# Patient Record
Sex: FEMALE | Race: WHITE | Employment: OTHER | URBAN - METROPOLITAN AREA
[De-identification: names, ages, dates, MRNs, and addresses within clinical notes are randomized per-mention and may not be internally consistent; named-entity substitution may affect disease eponyms.]

---

## 2017-01-01 ENCOUNTER — OFFICE VISIT (OUTPATIENT)
Dept: ONCOLOGY | Age: 82
End: 2017-01-01

## 2017-01-01 ENCOUNTER — HOSPITAL ENCOUNTER (OUTPATIENT)
Dept: PREADMISSION TESTING | Age: 82
Discharge: HOME | DRG: 326 | End: 2017-08-14
Attending: SURGERY
Payer: MEDICARE

## 2017-01-01 ENCOUNTER — OFFICE VISIT (OUTPATIENT)
Dept: SURGERY | Age: 82
End: 2017-01-01

## 2017-01-01 ENCOUNTER — TELEPHONE (OUTPATIENT)
Dept: SURGERY | Age: 82
End: 2017-01-01

## 2017-01-01 ENCOUNTER — OFFICE VISIT (OUTPATIENT)
Dept: FAMILY MEDICINE CLINIC | Age: 82
End: 2017-01-01

## 2017-01-01 ENCOUNTER — ANESTHESIA EVENT (OUTPATIENT)
Dept: SURGERY | Age: 82
DRG: 326 | End: 2017-01-01
Payer: MEDICARE

## 2017-01-01 ENCOUNTER — ANESTHESIA (OUTPATIENT)
Dept: ENDOSCOPY | Age: 82
End: 2017-01-01
Payer: MEDICARE

## 2017-01-01 ENCOUNTER — HOSPITAL ENCOUNTER (INPATIENT)
Age: 82
LOS: 2 days | DRG: 326 | End: 2017-08-19
Attending: SURGERY | Admitting: SURGERY
Payer: MEDICARE

## 2017-01-01 ENCOUNTER — HOSPITAL ENCOUNTER (OUTPATIENT)
Dept: CT IMAGING | Age: 82
Discharge: HOME | End: 2017-03-28
Attending: INTERNAL MEDICINE
Payer: MEDICARE

## 2017-01-01 ENCOUNTER — ANESTHESIA (OUTPATIENT)
Dept: SURGERY | Age: 82
DRG: 326 | End: 2017-01-01
Payer: MEDICARE

## 2017-01-01 ENCOUNTER — APPOINTMENT (OUTPATIENT)
Dept: GENERAL RADIOLOGY | Age: 82
End: 2017-01-01
Payer: MEDICARE

## 2017-01-01 ENCOUNTER — HOSPITAL ENCOUNTER (OUTPATIENT)
Age: 82
Setting detail: OUTPATIENT SURGERY
Discharge: HOME | End: 2017-07-27
Attending: INTERNAL MEDICINE | Admitting: INTERNAL MEDICINE
Payer: MEDICARE

## 2017-01-01 ENCOUNTER — APPOINTMENT (OUTPATIENT)
Dept: GENERAL RADIOLOGY | Age: 82
DRG: 326 | End: 2017-01-01
Attending: INTERNAL MEDICINE
Payer: MEDICARE

## 2017-01-01 ENCOUNTER — HOSPITAL ENCOUNTER (OUTPATIENT)
Dept: CT IMAGING | Age: 82
Discharge: HOME | End: 2017-08-07
Attending: INTERNAL MEDICINE
Payer: MEDICARE

## 2017-01-01 ENCOUNTER — TELEPHONE (OUTPATIENT)
Dept: ONCOLOGY | Age: 82
End: 2017-01-01

## 2017-01-01 ENCOUNTER — ANESTHESIA EVENT (OUTPATIENT)
Dept: ENDOSCOPY | Age: 82
End: 2017-01-01
Payer: MEDICARE

## 2017-01-01 ENCOUNTER — DOCUMENTATION ONLY (OUTPATIENT)
Dept: ONCOLOGY | Age: 82
End: 2017-01-01

## 2017-01-01 ENCOUNTER — APPOINTMENT (OUTPATIENT)
Dept: CT IMAGING | Age: 82
DRG: 326 | End: 2017-01-01
Attending: INTERNAL MEDICINE
Payer: MEDICARE

## 2017-01-01 ENCOUNTER — HOSPITAL ENCOUNTER (OUTPATIENT)
Dept: CT IMAGING | Age: 82
End: 2017-01-01
Attending: INTERNAL MEDICINE
Payer: MEDICARE

## 2017-01-01 ENCOUNTER — APPOINTMENT (OUTPATIENT)
Dept: GENERAL RADIOLOGY | Age: 82
DRG: 326 | End: 2017-01-01
Attending: SURGERY
Payer: MEDICARE

## 2017-01-01 VITALS
HEART RATE: 88 BPM | WEIGHT: 172.8 LBS | DIASTOLIC BLOOD PRESSURE: 59 MMHG | SYSTOLIC BLOOD PRESSURE: 104 MMHG | BODY MASS INDEX: 33.92 KG/M2 | HEIGHT: 60 IN | OXYGEN SATURATION: 97 % | RESPIRATION RATE: 18 BRPM | TEMPERATURE: 98.6 F

## 2017-01-01 VITALS
DIASTOLIC BLOOD PRESSURE: 86 MMHG | RESPIRATION RATE: 9 BRPM | OXYGEN SATURATION: 99 % | HEART RATE: 71 BPM | TEMPERATURE: 97.8 F | HEIGHT: 60 IN | SYSTOLIC BLOOD PRESSURE: 140 MMHG | WEIGHT: 171.38 LBS | BODY MASS INDEX: 33.65 KG/M2

## 2017-01-01 VITALS
RESPIRATION RATE: 22 BRPM | SYSTOLIC BLOOD PRESSURE: 125 MMHG | HEIGHT: 60 IN | OXYGEN SATURATION: 96 % | HEART RATE: 124 BPM | WEIGHT: 172.18 LBS | DIASTOLIC BLOOD PRESSURE: 49 MMHG | BODY MASS INDEX: 33.8 KG/M2 | TEMPERATURE: 97.7 F

## 2017-01-01 VITALS
HEIGHT: 60 IN | TEMPERATURE: 97.7 F | HEART RATE: 95 BPM | WEIGHT: 178.57 LBS | BODY MASS INDEX: 35.06 KG/M2 | OXYGEN SATURATION: 95 % | RESPIRATION RATE: 18 BRPM | DIASTOLIC BLOOD PRESSURE: 63 MMHG | SYSTOLIC BLOOD PRESSURE: 154 MMHG

## 2017-01-01 VITALS
HEIGHT: 60 IN | HEART RATE: 97 BPM | TEMPERATURE: 97.6 F | OXYGEN SATURATION: 97 % | SYSTOLIC BLOOD PRESSURE: 130 MMHG | RESPIRATION RATE: 18 BRPM | BODY MASS INDEX: 33.57 KG/M2 | WEIGHT: 171 LBS | DIASTOLIC BLOOD PRESSURE: 73 MMHG

## 2017-01-01 VITALS
RESPIRATION RATE: 20 BRPM | BODY MASS INDEX: 33.1 KG/M2 | HEIGHT: 60 IN | HEART RATE: 85 BPM | DIASTOLIC BLOOD PRESSURE: 75 MMHG | TEMPERATURE: 97.6 F | OXYGEN SATURATION: 99 % | SYSTOLIC BLOOD PRESSURE: 121 MMHG | WEIGHT: 168.6 LBS

## 2017-01-01 DIAGNOSIS — J06.9 UPPER RESPIRATORY TRACT INFECTION, UNSPECIFIED TYPE: ICD-10-CM

## 2017-01-01 DIAGNOSIS — R05.8 COUGH PRODUCTIVE OF PURULENT SPUTUM: ICD-10-CM

## 2017-01-01 DIAGNOSIS — I26.99 OTHER PULMONARY EMBOLISM WITHOUT ACUTE COR PULMONALE, UNSPECIFIED CHRONICITY (HCC): ICD-10-CM

## 2017-01-01 DIAGNOSIS — C18.2 MALIGNANT NEOPLASM OF ASCENDING COLON (HCC): Primary | ICD-10-CM

## 2017-01-01 DIAGNOSIS — R05.8 COUGH PRODUCTIVE OF PURULENT SPUTUM: Primary | ICD-10-CM

## 2017-01-01 DIAGNOSIS — C18.9 COLON CANCER (HCC): ICD-10-CM

## 2017-01-01 DIAGNOSIS — I26.99 OTHER PULMONARY EMBOLISM WITHOUT ACUTE COR PULMONALE, UNSPECIFIED CHRONICITY (HCC): Primary | ICD-10-CM

## 2017-01-01 DIAGNOSIS — C18.0 MALIGNANT NEOPLASM OF CECUM (HCC): ICD-10-CM

## 2017-01-01 LAB
ALBUMIN SERPL BCP-MCNC: 3.5 G/DL (ref 3.5–5)
ALBUMIN SERPL-MCNC: 1.8 G/DL (ref 3.5–5)
ALBUMIN SERPL-MCNC: 2 G/DL (ref 3.5–5)
ALBUMIN SERPL-MCNC: 2.3 G/DL (ref 3.5–5)
ALBUMIN SERPL-MCNC: 2.5 G/DL (ref 3.5–5)
ALBUMIN SERPL-MCNC: 2.5 G/DL (ref 3.5–5)
ALBUMIN SERPL-MCNC: 3.3 G/DL (ref 3.5–5)
ALBUMIN/GLOB SERPL: 0.8 {RATIO} (ref 1.1–2.2)
ALBUMIN/GLOB SERPL: 0.9 {RATIO} (ref 1.1–2.2)
ALBUMIN/GLOB SERPL: 1 {RATIO} (ref 1.1–2.2)
ALBUMIN/GLOB SERPL: 1.1 {RATIO} (ref 1.1–2.2)
ALP SERPL-CCNC: 101 U/L (ref 45–117)
ALP SERPL-CCNC: 112 U/L (ref 45–117)
ALP SERPL-CCNC: 36 U/L (ref 45–117)
ALP SERPL-CCNC: 44 U/L (ref 45–117)
ALP SERPL-CCNC: 58 U/L (ref 45–117)
ALP SERPL-CCNC: 67 U/L (ref 45–117)
ALP SERPL-CCNC: 72 U/L (ref 45–117)
ALT SERPL-CCNC: 17 U/L (ref 12–78)
ALT SERPL-CCNC: 17 U/L (ref 12–78)
ALT SERPL-CCNC: 21 U/L (ref 12–78)
ALT SERPL-CCNC: 22 U/L (ref 12–78)
ALT SERPL-CCNC: 35 U/L (ref 12–78)
ALT SERPL-CCNC: 886 U/L (ref 12–78)
ALT SERPL-CCNC: >3500 U/L (ref 12–78)
ANION GAP BLD CALC-SCNC: 19 MMOL/L (ref 5–15)
ANION GAP BLD CALC-SCNC: 6 MMOL/L (ref 5–15)
ANION GAP SERPL CALC-SCNC: 15 MMOL/L (ref 5–15)
ANION GAP SERPL CALC-SCNC: 15 MMOL/L (ref 5–15)
ANION GAP SERPL CALC-SCNC: 17 MMOL/L (ref 5–15)
ANION GAP SERPL CALC-SCNC: 18 MMOL/L (ref 5–15)
ANION GAP SERPL CALC-SCNC: 25 MMOL/L (ref 5–15)
ANION GAP SERPL CALC-SCNC: 34 MMOL/L (ref 5–15)
APPEARANCE UR: CLEAR
APTT PPP: 26.6 SEC (ref 22.1–32.5)
ARTERIAL PATENCY WRIST A: ABNORMAL
ARTERIAL PATENCY WRIST A: YES
ARTERIAL PATENCY WRIST A: YES
AST SERPL W P-5'-P-CCNC: 14 U/L (ref 15–37)
AST SERPL-CCNC: 1313 U/L (ref 15–37)
AST SERPL-CCNC: 24 U/L (ref 15–37)
AST SERPL-CCNC: 25 U/L (ref 15–37)
AST SERPL-CCNC: 45 U/L (ref 15–37)
AST SERPL-CCNC: 74 U/L (ref 15–37)
AST SERPL-CCNC: >2000 U/L (ref 15–37)
ATRIAL RATE: 117 BPM
ATRIAL RATE: 143 BPM
ATRIAL RATE: 95 BPM
BACTERIA URNS QL MICRO: NEGATIVE /HPF
BASE DEFICIT BLDA-SCNC: 14.5 MMOL/L
BASE DEFICIT BLDA-SCNC: 16.8 MMOL/L
BASE DEFICIT BLDA-SCNC: 17.6 MMOL/L
BASE DEFICIT BLDA-SCNC: 17.8 MMOL/L
BASE DEFICIT BLDA-SCNC: 18.1 MMOL/L
BASE DEFICIT BLDA-SCNC: 21.5 MMOL/L
BASE DEFICIT BLDA-SCNC: 9.2 MMOL/L
BASOPHILS # BLD: 0 K/UL
BASOPHILS # BLD: 0 K/UL
BASOPHILS NFR BLD: 0 %
BASOPHILS NFR BLD: 0 %
BDY SITE: ABNORMAL
BILIRUB SERPL-MCNC: 0.3 MG/DL (ref 0.2–1)
BILIRUB SERPL-MCNC: 0.3 MG/DL (ref 0.2–1)
BILIRUB SERPL-MCNC: 0.4 MG/DL (ref 0.2–1)
BILIRUB SERPL-MCNC: 0.4 MG/DL (ref 0.2–1)
BILIRUB SERPL-MCNC: 0.5 MG/DL (ref 0.2–1)
BILIRUB SERPL-MCNC: 0.6 MG/DL (ref 0.2–1)
BILIRUB SERPL-MCNC: 1.1 MG/DL (ref 0.2–1)
BILIRUB UR QL: NEGATIVE
BREATHS.SPONTANEOUS ON VENT: 20
BREATHS.SPONTANEOUS ON VENT: 35
BUN BLD-MCNC: 11 MG/DL (ref 9–20)
BUN SERPL-MCNC: 13 MG/DL (ref 6–20)
BUN SERPL-MCNC: 19 MG/DL (ref 6–20)
BUN SERPL-MCNC: 21 MG/DL (ref 6–20)
BUN SERPL-MCNC: 21 MG/DL (ref 6–20)
BUN SERPL-MCNC: 22 MG/DL (ref 6–20)
BUN SERPL-MCNC: 22 MG/DL (ref 6–20)
BUN SERPL-MCNC: 28 MG/DL (ref 6–20)
BUN/CREAT SERPL: 10 (ref 12–20)
BUN/CREAT SERPL: 18 (ref 12–20)
BUN/CREAT SERPL: 8 (ref 12–20)
BUN/CREAT SERPL: 8 (ref 12–20)
BUN/CREAT SERPL: 9 (ref 12–20)
CA-I BLD-MCNC: 1.18 MMOL/L (ref 1.12–1.32)
CALCIUM SERPL-MCNC: 5.7 MG/DL (ref 8.5–10.1)
CALCIUM SERPL-MCNC: 6.4 MG/DL (ref 8.5–10.1)
CALCIUM SERPL-MCNC: 6.5 MG/DL (ref 8.5–10.1)
CALCIUM SERPL-MCNC: 7.3 MG/DL (ref 8.5–10.1)
CALCIUM SERPL-MCNC: 7.7 MG/DL (ref 8.5–10.1)
CALCIUM SERPL-MCNC: 8 MG/DL (ref 8.5–10.1)
CALCIUM SERPL-MCNC: 8.4 MG/DL (ref 8.5–10.1)
CALCULATED P AXIS, ECG09: 21 DEGREES
CALCULATED P AXIS, ECG09: 42 DEGREES
CALCULATED P AXIS, ECG09: 46 DEGREES
CALCULATED R AXIS, ECG10: -20 DEGREES
CALCULATED R AXIS, ECG10: -44 DEGREES
CALCULATED R AXIS, ECG10: 106 DEGREES
CALCULATED T AXIS, ECG11: 0 DEGREES
CALCULATED T AXIS, ECG11: 26 DEGREES
CALCULATED T AXIS, ECG11: 30 DEGREES
CHLORIDE BLD-SCNC: 99 MMOL/L (ref 98–107)
CHLORIDE SERPL-SCNC: 101 MMOL/L (ref 97–108)
CHLORIDE SERPL-SCNC: 104 MMOL/L (ref 97–108)
CHLORIDE SERPL-SCNC: 104 MMOL/L (ref 97–108)
CHLORIDE SERPL-SCNC: 106 MMOL/L (ref 97–108)
CHLORIDE SERPL-SCNC: 107 MMOL/L (ref 97–108)
CHLORIDE SERPL-SCNC: 94 MMOL/L (ref 97–108)
CHLORIDE SERPL-SCNC: 99 MMOL/L (ref 97–108)
CK MB CFR SERPL CALC: 1.5 % (ref 0–2.5)
CK MB CFR SERPL CALC: 2.2 % (ref 0–2.5)
CK MB CFR SERPL CALC: 2.3 % (ref 0–2.5)
CK MB SERPL-MCNC: 14.5 NG/ML (ref 5–25)
CK MB SERPL-MCNC: 2.4 NG/ML (ref 5–25)
CK MB SERPL-MCNC: 7.8 NG/ML (ref 5–25)
CK SERPL-CCNC: 158 U/L (ref 26–192)
CK SERPL-CCNC: 361 U/L (ref 26–192)
CK SERPL-CCNC: 618 U/L (ref 26–192)
CO2 BLD-SCNC: 22 MMOL/L (ref 21–32)
CO2 SERPL-SCNC: 10 MMOL/L (ref 21–32)
CO2 SERPL-SCNC: 11 MMOL/L (ref 21–32)
CO2 SERPL-SCNC: 13 MMOL/L (ref 21–32)
CO2 SERPL-SCNC: 16 MMOL/L (ref 21–32)
CO2 SERPL-SCNC: 16 MMOL/L (ref 21–32)
CO2 SERPL-SCNC: 18 MMOL/L (ref 21–32)
CO2 SERPL-SCNC: 27 MMOL/L (ref 21–32)
COLOR UR: NORMAL
CORTIS SERPL-MCNC: 64.9 UG/DL
CREAT BLD-MCNC: 0.7 MG/DL (ref 0.6–1.3)
CREAT BLD-MCNC: 0.8 MG/DL (ref 0.6–1.3)
CREAT BLD-MCNC: 1 MG/DL (ref 0.6–1.3)
CREAT SERPL-MCNC: 1.19 MG/DL (ref 0.55–1.02)
CREAT SERPL-MCNC: 1.29 MG/DL (ref 0.55–1.02)
CREAT SERPL-MCNC: 2.24 MG/DL (ref 0.55–1.02)
CREAT SERPL-MCNC: 2.32 MG/DL (ref 0.55–1.02)
CREAT SERPL-MCNC: 2.35 MG/DL (ref 0.55–1.02)
CREAT SERPL-MCNC: 2.63 MG/DL (ref 0.55–1.02)
CREAT SERPL-MCNC: 3.17 MG/DL (ref 0.55–1.02)
DIAGNOSIS, 93000: NORMAL
DIFFERENTIAL METHOD BLD: ABNORMAL
EOSINOPHIL # BLD: 0 K/UL
EOSINOPHIL # BLD: 0 K/UL
EOSINOPHIL NFR BLD: 0 %
EOSINOPHIL NFR BLD: 0 %
EPAP/CPAP/PEEP, PAPEEP: 6
EPAP/CPAP/PEEP, PAPEEP: 8
EPITH CASTS URNS QL MICRO: NORMAL /LPF
ERYTHROCYTE [DISTWIDTH] IN BLOOD BY AUTOMATED COUNT: 20.7 % (ref 11.5–14.5)
ERYTHROCYTE [DISTWIDTH] IN BLOOD BY AUTOMATED COUNT: 20.8 % (ref 11.5–14.5)
ERYTHROCYTE [DISTWIDTH] IN BLOOD BY AUTOMATED COUNT: 21.1 % (ref 11.5–14.5)
ERYTHROCYTE [DISTWIDTH] IN BLOOD BY AUTOMATED COUNT: 21.4 % (ref 11.5–14.5)
ERYTHROCYTE [DISTWIDTH] IN BLOOD BY AUTOMATED COUNT: 21.9 % (ref 11.5–14.5)
FIO2 ON VENT: 50 %
FIO2 ON VENT: 60 %
GAS FLOW.O2 O2 DELIVERY SYS: 6 L/MIN
GAS FLOW.O2 SETTING OXYMISER: 14 L/MIN
GAS FLOW.O2 SETTING OXYMISER: 14 L/MIN
GAS FLOW.O2 SETTING OXYMISER: 24 L/MIN
GLOBULIN SER CALC-MCNC: 2.2 G/DL (ref 2–4)
GLOBULIN SER CALC-MCNC: 2.3 G/DL (ref 2–4)
GLOBULIN SER CALC-MCNC: 2.4 G/DL (ref 2–4)
GLOBULIN SER CALC-MCNC: 2.9 G/DL (ref 2–4)
GLOBULIN SER CALC-MCNC: 3.1 G/DL (ref 2–4)
GLOBULIN SER CALC-MCNC: 3.6 G/DL (ref 2–4)
GLOBULIN SER CALC-MCNC: 3.6 G/DL (ref 2–4)
GLUCOSE BLD STRIP.AUTO-MCNC: 101 MG/DL (ref 65–100)
GLUCOSE BLD STRIP.AUTO-MCNC: 102 MG/DL (ref 65–100)
GLUCOSE BLD STRIP.AUTO-MCNC: 102 MG/DL (ref 65–100)
GLUCOSE BLD STRIP.AUTO-MCNC: 111 MG/DL (ref 65–100)
GLUCOSE BLD STRIP.AUTO-MCNC: 158 MG/DL (ref 65–100)
GLUCOSE BLD STRIP.AUTO-MCNC: 211 MG/DL (ref 65–100)
GLUCOSE BLD STRIP.AUTO-MCNC: 246 MG/DL (ref 65–100)
GLUCOSE BLD STRIP.AUTO-MCNC: 255 MG/DL (ref 65–100)
GLUCOSE BLD STRIP.AUTO-MCNC: 41 MG/DL (ref 65–100)
GLUCOSE BLD STRIP.AUTO-MCNC: 41 MG/DL (ref 65–100)
GLUCOSE BLD STRIP.AUTO-MCNC: 59 MG/DL (ref 65–100)
GLUCOSE BLD STRIP.AUTO-MCNC: 69 MG/DL (ref 65–100)
GLUCOSE BLD-MCNC: 98 MG/DL (ref 65–100)
GLUCOSE SERPL-MCNC: 117 MG/DL (ref 65–100)
GLUCOSE SERPL-MCNC: 139 MG/DL (ref 65–100)
GLUCOSE SERPL-MCNC: 143 MG/DL (ref 65–100)
GLUCOSE SERPL-MCNC: 154 MG/DL (ref 65–100)
GLUCOSE SERPL-MCNC: 82 MG/DL (ref 65–100)
GLUCOSE SERPL-MCNC: 97 MG/DL (ref 65–100)
GLUCOSE SERPL-MCNC: 98 MG/DL (ref 65–100)
GLUCOSE UR STRIP.AUTO-MCNC: NEGATIVE MG/DL
HCO3 BLDA-SCNC: 10 MMOL/L (ref 22–26)
HCO3 BLDA-SCNC: 15 MMOL/L (ref 22–26)
HCO3 BLDA-SCNC: 8 MMOL/L (ref 22–26)
HCO3 BLDA-SCNC: 9 MMOL/L (ref 22–26)
HCT VFR BLD AUTO: 26.3 % (ref 35–47)
HCT VFR BLD AUTO: 29.2 % (ref 35–47)
HCT VFR BLD AUTO: 29.4 % (ref 35–47)
HCT VFR BLD AUTO: 29.6 % (ref 35–47)
HCT VFR BLD AUTO: 31.1 % (ref 35–47)
HCT VFR BLD AUTO: 33.9 % (ref 35–47)
HCT VFR BLD CALC: 31 % (ref 35–47)
HGB BLD-MCNC: 10.5 GM/DL (ref 11.5–16)
HGB BLD-MCNC: 7.6 G/DL (ref 11.5–16)
HGB BLD-MCNC: 8.3 G/DL (ref 11.5–16)
HGB BLD-MCNC: 8.6 G/DL (ref 11.5–16)
HGB BLD-MCNC: 8.9 G/DL (ref 11.5–16)
HGB BLD-MCNC: 9.2 G/DL (ref 11.5–16)
HGB BLD-MCNC: 9.7 G/DL (ref 11.5–16)
HGB UR QL STRIP: NEGATIVE
HYALINE CASTS URNS QL MICRO: NORMAL /LPF (ref 0–5)
INR PPP: 1 (ref 0.9–1.1)
INR PPP: 1.9 (ref 0.9–1.1)
KETONES UR QL STRIP.AUTO: NEGATIVE MG/DL
LACTATE SERPL-SCNC: 11.2 MMOL/L (ref 0.4–2)
LACTATE SERPL-SCNC: 18 MMOL/L (ref 0.4–2)
LACTATE SERPL-SCNC: 25.4 MMOL/L (ref 0.4–2)
LEUKOCYTE ESTERASE UR QL STRIP.AUTO: NEGATIVE
LYMPHOCYTES # BLD: 1.2 K/UL
LYMPHOCYTES # BLD: 2.4 K/UL
LYMPHOCYTES NFR BLD: 21 %
LYMPHOCYTES NFR BLD: 38 %
MAGNESIUM SERPL-MCNC: 2.1 MG/DL (ref 1.6–2.4)
MAGNESIUM SERPL-MCNC: 2.2 MG/DL (ref 1.6–2.4)
MCH RBC QN AUTO: 19.4 PG (ref 26–34)
MCH RBC QN AUTO: 19.6 PG (ref 26–34)
MCH RBC QN AUTO: 19.9 PG (ref 26–34)
MCH RBC QN AUTO: 20.3 PG (ref 26–34)
MCH RBC QN AUTO: 21.5 PG (ref 26–34)
MCHC RBC AUTO-ENTMCNC: 28.4 G/DL (ref 30–36.5)
MCHC RBC AUTO-ENTMCNC: 28.9 G/DL (ref 30–36.5)
MCHC RBC AUTO-ENTMCNC: 29.3 G/DL (ref 30–36.5)
MCHC RBC AUTO-ENTMCNC: 29.6 G/DL (ref 30–36.5)
MCHC RBC AUTO-ENTMCNC: 30.1 G/DL (ref 30–36.5)
MCV RBC AUTO: 66.2 FL (ref 80–99)
MCV RBC AUTO: 68.5 FL (ref 80–99)
MCV RBC AUTO: 68.8 FL (ref 80–99)
MCV RBC AUTO: 68.9 FL (ref 80–99)
MCV RBC AUTO: 71.7 FL (ref 80–99)
METAMYELOCYTES NFR BLD MANUAL: 4 %
METAMYELOCYTES NFR BLD MANUAL: 9 %
MONOCYTES # BLD: 0.1 K/UL
MONOCYTES # BLD: 0.7 K/UL
MONOCYTES NFR BLD: 11 %
MONOCYTES NFR BLD: 2 %
MYELOCYTES NFR BLD MANUAL: 2 %
NEUTS BAND NFR BLD MANUAL: 15 %
NEUTS BAND NFR BLD MANUAL: 19 %
NEUTS SEG # BLD: 2.6 K/UL
NEUTS SEG # BLD: 4 K/UL
NEUTS SEG NFR BLD: 27 %
NEUTS SEG NFR BLD: 52 %
NITRITE UR QL STRIP.AUTO: NEGATIVE
NRBC # BLD: 0.05 K/UL (ref 0–0.01)
NRBC # BLD: 0.07 K/UL (ref 0–0.01)
NRBC BLD-RTO: 0.7 PER 100 WBC
NRBC BLD-RTO: 1.3 PER 100 WBC
P-R INTERVAL, ECG05: 130 MS
P-R INTERVAL, ECG05: 140 MS
P-R INTERVAL, ECG05: 162 MS
PCO2 BLDA: 22 MMHG (ref 35–45)
PCO2 BLDA: 26 MMHG (ref 35–45)
PCO2 BLDA: 26 MMHG (ref 35–45)
PCO2 BLDA: 27 MMHG (ref 35–45)
PCO2 BLDA: 28 MMHG (ref 35–45)
PCO2 BLDA: 31 MMHG (ref 35–45)
PCO2 BLDA: 32 MMHG (ref 35–45)
PH BLDA: 7.06 [PH] (ref 7.35–7.45)
PH BLDA: 7.12 [PH] (ref 7.35–7.45)
PH BLDA: 7.13 [PH] (ref 7.35–7.45)
PH BLDA: 7.17 [PH] (ref 7.35–7.45)
PH BLDA: 7.19 [PH] (ref 7.35–7.45)
PH BLDA: 7.28 [PH] (ref 7.35–7.45)
PH BLDA: 7.35 [PH] (ref 7.35–7.45)
PH UR STRIP: 6 [PH] (ref 5–8)
PHOSPHATE SERPL-MCNC: 10.2 MG/DL (ref 2.6–4.7)
PHOSPHATE SERPL-MCNC: 7.9 MG/DL (ref 2.6–4.7)
PLATELET # BLD AUTO: 128 K/UL (ref 150–400)
PLATELET # BLD AUTO: 173 K/UL (ref 150–400)
PLATELET # BLD AUTO: 205 K/UL (ref 150–400)
PLATELET # BLD AUTO: 221 K/UL (ref 150–400)
PLATELET # BLD AUTO: 314 K/UL (ref 150–400)
PO2 BLDA: 100 MMHG (ref 80–100)
PO2 BLDA: 103 MMHG (ref 80–100)
PO2 BLDA: 71 MMHG (ref 80–100)
PO2 BLDA: 75 MMHG (ref 80–100)
PO2 BLDA: 87 MMHG (ref 80–100)
PO2 BLDA: 88 MMHG (ref 80–100)
PO2 BLDA: 92 MMHG (ref 80–100)
POTASSIUM BLD-SCNC: 3.9 MMOL/L (ref 3.5–5.1)
POTASSIUM SERPL-SCNC: 3.5 MMOL/L (ref 3.5–5.1)
POTASSIUM SERPL-SCNC: 3.7 MMOL/L (ref 3.5–5.1)
POTASSIUM SERPL-SCNC: 3.8 MMOL/L (ref 3.5–5.1)
POTASSIUM SERPL-SCNC: 4 MMOL/L (ref 3.5–5.1)
POTASSIUM SERPL-SCNC: 4.1 MMOL/L (ref 3.5–5.1)
POTASSIUM SERPL-SCNC: 4.5 MMOL/L (ref 3.5–5.1)
POTASSIUM SERPL-SCNC: 4.7 MMOL/L (ref 3.5–5.1)
PROT SERPL-MCNC: 4.1 G/DL (ref 6.4–8.2)
PROT SERPL-MCNC: 4.4 G/DL (ref 6.4–8.2)
PROT SERPL-MCNC: 4.7 G/DL (ref 6.4–8.2)
PROT SERPL-MCNC: 5.2 G/DL (ref 6.4–8.2)
PROT SERPL-MCNC: 5.6 G/DL (ref 6.4–8.2)
PROT SERPL-MCNC: 6.9 G/DL (ref 6.4–8.2)
PROT SERPL-MCNC: 7.1 G/DL (ref 6.4–8.2)
PROT UR STRIP-MCNC: NEGATIVE MG/DL
PROTHROMBIN TIME: 10 SEC (ref 9–11.1)
PROTHROMBIN TIME: 19.9 SEC (ref 9–11.1)
Q-T INTERVAL, ECG07: 286 MS
Q-T INTERVAL, ECG07: 314 MS
Q-T INTERVAL, ECG07: 354 MS
QRS DURATION, ECG06: 70 MS
QRS DURATION, ECG06: 78 MS
QRS DURATION, ECG06: 82 MS
QTC CALCULATION (BEZET), ECG08: 438 MS
QTC CALCULATION (BEZET), ECG08: 441 MS
QTC CALCULATION (BEZET), ECG08: 444 MS
RBC # BLD AUTO: 3.82 M/UL (ref 3.8–5.2)
RBC # BLD AUTO: 4.13 M/UL (ref 3.8–5.2)
RBC # BLD AUTO: 4.24 M/UL (ref 3.8–5.2)
RBC # BLD AUTO: 4.44 M/UL (ref 3.8–5.2)
RBC # BLD AUTO: 4.54 M/UL (ref 3.8–5.2)
RBC #/AREA URNS HPF: NORMAL /HPF (ref 0–5)
RBC MORPH BLD: ABNORMAL
SAO2 % BLD: 93 % (ref 92–97)
SAO2 % BLD: 93 % (ref 92–97)
SAO2 % BLD: 94 % (ref 92–97)
SAO2 % BLD: 94 % (ref 92–97)
SAO2 % BLD: 95 % (ref 92–97)
SAO2 % BLD: 96 % (ref 92–97)
SAO2 % BLD: 97 % (ref 92–97)
SAO2% DEVICE SAO2% SENSOR NAME: ABNORMAL
SERVICE CMNT-IMP: ABNORMAL
SERVICE CMNT-IMP: NORMAL
SODIUM BLD-SCNC: 135 MMOL/L (ref 136–145)
SODIUM SERPL-SCNC: 132 MMOL/L (ref 136–145)
SODIUM SERPL-SCNC: 135 MMOL/L (ref 136–145)
SODIUM SERPL-SCNC: 136 MMOL/L (ref 136–145)
SODIUM SERPL-SCNC: 137 MMOL/L (ref 136–145)
SODIUM SERPL-SCNC: 138 MMOL/L (ref 136–145)
SODIUM SERPL-SCNC: 139 MMOL/L (ref 136–145)
SODIUM SERPL-SCNC: 139 MMOL/L (ref 136–145)
SP GR UR REFRACTOMETRY: 1.01 (ref 1–1.03)
SPECIMEN SITE: ABNORMAL
THERAPEUTIC RANGE,PTTT: NORMAL SECS (ref 58–77)
TROPONIN I SERPL-MCNC: 0.04 NG/ML
TROPONIN I SERPL-MCNC: 0.06 NG/ML
TROPONIN I SERPL-MCNC: <0.04 NG/ML
TSH SERPL DL<=0.05 MIU/L-ACNC: 8.68 UIU/ML (ref 0.36–3.74)
UA: UC IF INDICATED,UAUC: NORMAL
UROBILINOGEN UR QL STRIP.AUTO: 0.2 EU/DL (ref 0.2–1)
VENTILATION MODE VENT: ABNORMAL
VENTRICULAR RATE, ECG03: 117 BPM
VENTRICULAR RATE, ECG03: 143 BPM
VENTRICULAR RATE, ECG03: 95 BPM
VT SETTING VENT: 400 ML
WBC # BLD AUTO: 4.4 K/UL (ref 3.6–11)
WBC # BLD AUTO: 5.7 K/UL (ref 3.6–11)
WBC # BLD AUTO: 6.2 K/UL (ref 3.6–11)
WBC # BLD AUTO: 7.2 K/UL (ref 3.6–11)
WBC # BLD AUTO: 9.2 K/UL (ref 3.6–11)
WBC MORPH BLD: ABNORMAL
WBC NRBC COR # BLD: ABNORMAL 10*3/UL
WBC NRBC COR # BLD: ABNORMAL 10*3/UL
WBC URNS QL MICRO: NORMAL /HPF (ref 0–4)

## 2017-01-01 PROCEDURE — 77030034849: Performed by: SURGERY

## 2017-01-01 PROCEDURE — 82565 ASSAY OF CREATININE: CPT

## 2017-01-01 PROCEDURE — 82803 BLOOD GASES ANY COMBINATION: CPT | Performed by: INTERNAL MEDICINE

## 2017-01-01 PROCEDURE — 74011000250 HC RX REV CODE- 250

## 2017-01-01 PROCEDURE — 94002 VENT MGMT INPAT INIT DAY: CPT

## 2017-01-01 PROCEDURE — 74011250637 HC RX REV CODE- 250/637: Performed by: INTERNAL MEDICINE

## 2017-01-01 PROCEDURE — 74011250636 HC RX REV CODE- 250/636: Performed by: INTERNAL MEDICINE

## 2017-01-01 PROCEDURE — 77030008684 HC TU ET CUF COVD -B: Performed by: ANESTHESIOLOGY

## 2017-01-01 PROCEDURE — 77030011640 HC PAD GRND REM COVD -A: Performed by: SURGERY

## 2017-01-01 PROCEDURE — 76060000034 HC ANESTHESIA 1.5 TO 2 HR: Performed by: SURGERY

## 2017-01-01 PROCEDURE — 5A1945Z RESPIRATORY VENTILATION, 24-96 CONSECUTIVE HOURS: ICD-10-PCS | Performed by: ANESTHESIOLOGY

## 2017-01-01 PROCEDURE — 76210000000 HC OR PH I REC 2 TO 2.5 HR: Performed by: SURGERY

## 2017-01-01 PROCEDURE — 74011250636 HC RX REV CODE- 250/636

## 2017-01-01 PROCEDURE — 65620000000 HC RM CCU GENERAL

## 2017-01-01 PROCEDURE — 77030021668 HC NEB PREFIL KT VYRM -A

## 2017-01-01 PROCEDURE — 77030033067 HC SUT PDO STRATFX SPIR J&J -B: Performed by: SURGERY

## 2017-01-01 PROCEDURE — 74011000250 HC RX REV CODE- 250: Performed by: SURGERY

## 2017-01-01 PROCEDURE — 77030008771 HC TU NG SALEM SUMP -A: Performed by: SURGERY

## 2017-01-01 PROCEDURE — 74011250637 HC RX REV CODE- 250/637: Performed by: SURGERY

## 2017-01-01 PROCEDURE — 74177 CT ABD & PELVIS W/CONTRAST: CPT

## 2017-01-01 PROCEDURE — 77030018836 HC SOL IRR NACL ICUM -A: Performed by: SURGERY

## 2017-01-01 PROCEDURE — 80053 COMPREHEN METABOLIC PANEL: CPT | Performed by: INTERNAL MEDICINE

## 2017-01-01 PROCEDURE — 82962 GLUCOSE BLOOD TEST: CPT

## 2017-01-01 PROCEDURE — 88309 TISSUE EXAM BY PATHOLOGIST: CPT | Performed by: SURGERY

## 2017-01-01 PROCEDURE — 77030008517 HC TBNG INSUF ENDO STOR -B: Performed by: SURGERY

## 2017-01-01 PROCEDURE — 36415 COLL VENOUS BLD VENIPUNCTURE: CPT | Performed by: SURGERY

## 2017-01-01 PROCEDURE — P9045 ALBUMIN (HUMAN), 5%, 250 ML: HCPCS

## 2017-01-01 PROCEDURE — 77030018316 HC SEAL FBRN TISSL 4ML BAXT -C: Performed by: SURGERY

## 2017-01-01 PROCEDURE — 30233N1 TRANSFUSION OF NONAUTOLOGOUS RED BLOOD CELLS INTO PERIPHERAL VEIN, PERCUTANEOUS APPROACH: ICD-10-PCS | Performed by: SURGERY

## 2017-01-01 PROCEDURE — 74176 CT ABD & PELVIS W/O CONTRAST: CPT

## 2017-01-01 PROCEDURE — 94003 VENT MGMT INPAT SUBQ DAY: CPT

## 2017-01-01 PROCEDURE — 76060000032 HC ANESTHESIA 0.5 TO 1 HR: Performed by: INTERNAL MEDICINE

## 2017-01-01 PROCEDURE — 77030008771 HC TU NG SALEM SUMP -A: Performed by: ANESTHESIOLOGY

## 2017-01-01 PROCEDURE — 36430 TRANSFUSION BLD/BLD COMPNT: CPT

## 2017-01-01 PROCEDURE — 8E0W4CZ ROBOTIC ASSISTED PROCEDURE OF TRUNK REGION, PERCUTANEOUS ENDOSCOPIC APPROACH: ICD-10-PCS | Performed by: SURGERY

## 2017-01-01 PROCEDURE — 77030019908 HC STETH ESOPH SIMS -A: Performed by: ANESTHESIOLOGY

## 2017-01-01 PROCEDURE — 71010 XR CHEST PORT: CPT

## 2017-01-01 PROCEDURE — 77030020061 HC IV BLD WRMR ADMIN SET 3M -B: Performed by: ANESTHESIOLOGY

## 2017-01-01 PROCEDURE — 83605 ASSAY OF LACTIC ACID: CPT | Performed by: INTERNAL MEDICINE

## 2017-01-01 PROCEDURE — 77030016154: Performed by: SURGERY

## 2017-01-01 PROCEDURE — 82550 ASSAY OF CK (CPK): CPT | Performed by: SURGERY

## 2017-01-01 PROCEDURE — 77030013079 HC BLNKT BAIR HGGR 3M -A: Performed by: ANESTHESIOLOGY

## 2017-01-01 PROCEDURE — 74011000250 HC RX REV CODE- 250: Performed by: INTERNAL MEDICINE

## 2017-01-01 PROCEDURE — 77030009426 HC FCPS BIOP ENDOSC BSC -B: Performed by: INTERNAL MEDICINE

## 2017-01-01 PROCEDURE — 77030010507 HC ADH SKN DERMBND J&J -B: Performed by: SURGERY

## 2017-01-01 PROCEDURE — 85025 COMPLETE CBC W/AUTO DIFF WBC: CPT | Performed by: INTERNAL MEDICINE

## 2017-01-01 PROCEDURE — 84100 ASSAY OF PHOSPHORUS: CPT | Performed by: SURGERY

## 2017-01-01 PROCEDURE — 77030018390 HC SPNG HEMSTAT2 J&J -B: Performed by: SURGERY

## 2017-01-01 PROCEDURE — 80047 BASIC METABLC PNL IONIZED CA: CPT

## 2017-01-01 PROCEDURE — 31500 INSERT EMERGENCY AIRWAY: CPT

## 2017-01-01 PROCEDURE — 77030002933 HC SUT MCRYL J&J -A: Performed by: SURGERY

## 2017-01-01 PROCEDURE — 77030019702 HC WRP THER MENM -C: Performed by: SURGERY

## 2017-01-01 PROCEDURE — 82533 TOTAL CORTISOL: CPT | Performed by: INTERNAL MEDICINE

## 2017-01-01 PROCEDURE — 74011000258 HC RX REV CODE- 258: Performed by: INTERNAL MEDICINE

## 2017-01-01 PROCEDURE — C1751 CATH, INF, PER/CENT/MIDLINE: HCPCS

## 2017-01-01 PROCEDURE — 74011000254 HC RX REV CODE- 254

## 2017-01-01 PROCEDURE — 74011000258 HC RX REV CODE- 258

## 2017-01-01 PROCEDURE — 77030035236 HC SUT PDS STRATFX BARB J&J -B: Performed by: SURGERY

## 2017-01-01 PROCEDURE — 77030029640 HC SEAL VSL ENDOWR ONE INTU -F: Performed by: SURGERY

## 2017-01-01 PROCEDURE — 77030020782 HC GWN BAIR PAWS FLX 3M -B

## 2017-01-01 PROCEDURE — 0BH17EZ INSERTION OF ENDOTRACHEAL AIRWAY INTO TRACHEA, VIA NATURAL OR ARTIFICIAL OPENING: ICD-10-PCS | Performed by: ANESTHESIOLOGY

## 2017-01-01 PROCEDURE — 77030016151 HC PROTCTR LNS DFOG COVD -B: Performed by: SURGERY

## 2017-01-01 PROCEDURE — P9016 RBC LEUKOCYTES REDUCED: HCPCS | Performed by: SURGERY

## 2017-01-01 PROCEDURE — 77030008467 HC STPLR SKN COVD -B: Performed by: SURGERY

## 2017-01-01 PROCEDURE — 36556 INSERT NON-TUNNEL CV CATH: CPT

## 2017-01-01 PROCEDURE — 85018 HEMOGLOBIN: CPT | Performed by: SURGERY

## 2017-01-01 PROCEDURE — 76040000007: Performed by: INTERNAL MEDICINE

## 2017-01-01 PROCEDURE — 71275 CT ANGIOGRAPHY CHEST: CPT

## 2017-01-01 PROCEDURE — 74000 XR ABD PORT  1 V: CPT

## 2017-01-01 PROCEDURE — 77030013567 HC DRN WND RESERV BARD -A: Performed by: SURGERY

## 2017-01-01 PROCEDURE — 74011636320 HC RX REV CODE- 636/320: Performed by: INTERNAL MEDICINE

## 2017-01-01 PROCEDURE — 77030003029 HC SUT VCRL J&J -B: Performed by: SURGERY

## 2017-01-01 PROCEDURE — 93005 ELECTROCARDIOGRAM TRACING: CPT

## 2017-01-01 PROCEDURE — 84484 ASSAY OF TROPONIN QUANT: CPT | Performed by: INTERNAL MEDICINE

## 2017-01-01 PROCEDURE — 77030026438 HC STYL ET INTUB CARD -A: Performed by: ANESTHESIOLOGY

## 2017-01-01 PROCEDURE — 77030012406 HC DRN WND PENRS BARD -A: Performed by: SURGERY

## 2017-01-01 PROCEDURE — 03HY32Z INSERTION OF MONITORING DEVICE INTO UPPER ARTERY, PERCUTANEOUS APPROACH: ICD-10-PCS | Performed by: ANESTHESIOLOGY

## 2017-01-01 PROCEDURE — 77030008756 HC TU IRR SUC STRY -B: Performed by: SURGERY

## 2017-01-01 PROCEDURE — 76010000880 HC OR TIME 4 TO 4.5HR INTENSV - TIER 2: Performed by: SURGERY

## 2017-01-01 PROCEDURE — 85610 PROTHROMBIN TIME: CPT | Performed by: INTERNAL MEDICINE

## 2017-01-01 PROCEDURE — 77030002996 HC SUT SLK J&J -A: Performed by: SURGERY

## 2017-01-01 PROCEDURE — 36600 WITHDRAWAL OF ARTERIAL BLOOD: CPT | Performed by: INTERNAL MEDICINE

## 2017-01-01 PROCEDURE — 82550 ASSAY OF CK (CPK): CPT | Performed by: INTERNAL MEDICINE

## 2017-01-01 PROCEDURE — 74011250636 HC RX REV CODE- 250/636: Performed by: SURGERY

## 2017-01-01 PROCEDURE — 77030013797 HC KT TRNSDUC PRSSR EDWD -A

## 2017-01-01 PROCEDURE — 77030034850: Performed by: SURGERY

## 2017-01-01 PROCEDURE — 0DQ90ZZ REPAIR DUODENUM, OPEN APPROACH: ICD-10-PCS | Performed by: SURGERY

## 2017-01-01 PROCEDURE — 80053 COMPREHEN METABOLIC PANEL: CPT | Performed by: SURGERY

## 2017-01-01 PROCEDURE — 83735 ASSAY OF MAGNESIUM: CPT | Performed by: INTERNAL MEDICINE

## 2017-01-01 PROCEDURE — 74011000255 HC RX REV CODE- 255: Performed by: INTERNAL MEDICINE

## 2017-01-01 PROCEDURE — 84484 ASSAY OF TROPONIN QUANT: CPT | Performed by: SURGERY

## 2017-01-01 PROCEDURE — 77030013532 HC SEAL FBRN TISSL RDYTUSE 4ML BAXT -C: Performed by: SURGERY

## 2017-01-01 PROCEDURE — 76060000039 HC ANESTHESIA 4 TO 4.5 HR: Performed by: SURGERY

## 2017-01-01 PROCEDURE — 77030031139 HC SUT VCRL2 J&J -A: Performed by: SURGERY

## 2017-01-01 PROCEDURE — 83735 ASSAY OF MAGNESIUM: CPT | Performed by: SURGERY

## 2017-01-01 PROCEDURE — 77030032522 HC SHT STPL PK ENDOWR INTU -B: Performed by: SURGERY

## 2017-01-01 PROCEDURE — 74011250636 HC RX REV CODE- 250/636: Performed by: ANESTHESIOLOGY

## 2017-01-01 PROCEDURE — 77030002966 HC SUT PDS J&J -A: Performed by: SURGERY

## 2017-01-01 PROCEDURE — 36415 COLL VENOUS BLD VENIPUNCTURE: CPT | Performed by: INTERNAL MEDICINE

## 2017-01-01 PROCEDURE — 77030012879 HC MSK CPAP FLL FAC PHIL -B

## 2017-01-01 PROCEDURE — 85027 COMPLETE CBC AUTOMATED: CPT | Performed by: INTERNAL MEDICINE

## 2017-01-01 PROCEDURE — 02HV33Z INSERTION OF INFUSION DEVICE INTO SUPERIOR VENA CAVA, PERCUTANEOUS APPROACH: ICD-10-PCS | Performed by: ANESTHESIOLOGY

## 2017-01-01 PROCEDURE — 84443 ASSAY THYROID STIM HORMONE: CPT | Performed by: INTERNAL MEDICINE

## 2017-01-01 PROCEDURE — 77010033678 HC OXYGEN DAILY

## 2017-01-01 PROCEDURE — 77030011992 HC AIRWY NASOPHGL TELE -A: Performed by: ANESTHESIOLOGY

## 2017-01-01 PROCEDURE — 77030027138 HC INCENT SPIROMETER -A

## 2017-01-01 PROCEDURE — 0DTF4ZZ RESECTION OF RIGHT LARGE INTESTINE, PERCUTANEOUS ENDOSCOPIC APPROACH: ICD-10-PCS | Performed by: SURGERY

## 2017-01-01 PROCEDURE — 77030034133 HC APPL ENDOSCP FLX SPRY BAXT -C: Performed by: SURGERY

## 2017-01-01 PROCEDURE — 77030032521: Performed by: SURGERY

## 2017-01-01 PROCEDURE — 93306 TTE W/DOPPLER COMPLETE: CPT

## 2017-01-01 PROCEDURE — 88305 TISSUE EXAM BY PATHOLOGIST: CPT | Performed by: INTERNAL MEDICINE

## 2017-01-01 PROCEDURE — 76010000153 HC OR TIME 1.5 TO 2 HR: Performed by: SURGERY

## 2017-01-01 PROCEDURE — 77030020263 HC SOL INJ SOD CL0.9% LFCR 1000ML: Performed by: SURGERY

## 2017-01-01 PROCEDURE — 77030032490 HC SLV COMPR SCD KNE COVD -B: Performed by: SURGERY

## 2017-01-01 PROCEDURE — 83605 ASSAY OF LACTIC ACID: CPT | Performed by: SURGERY

## 2017-01-01 PROCEDURE — 84100 ASSAY OF PHOSPHORUS: CPT | Performed by: INTERNAL MEDICINE

## 2017-01-01 PROCEDURE — 88342 IMHCHEM/IMCYTCHM 1ST ANTB: CPT | Performed by: SURGERY

## 2017-01-01 PROCEDURE — 77030032523 HC RELD STPL PK ENDORS INTU -C: Performed by: SURGERY

## 2017-01-01 DEVICE — SEALANT KT FIBRIN HEMSTAT 4ML -- TISSEEL: Type: IMPLANTABLE DEVICE | Status: FUNCTIONAL

## 2017-01-01 RX ORDER — SODIUM CHLORIDE, SODIUM LACTATE, POTASSIUM CHLORIDE, CALCIUM CHLORIDE 600; 310; 30; 20 MG/100ML; MG/100ML; MG/100ML; MG/100ML
25 INJECTION, SOLUTION INTRAVENOUS CONTINUOUS
Status: DISCONTINUED | OUTPATIENT
Start: 2017-01-01 | End: 2017-01-01 | Stop reason: HOSPADM

## 2017-01-01 RX ORDER — LORATADINE 10 MG/1
10 TABLET ORAL DAILY
Status: DISCONTINUED | OUTPATIENT
Start: 2017-01-01 | End: 2017-01-01

## 2017-01-01 RX ORDER — LIDOCAINE HYDROCHLORIDE 20 MG/ML
INJECTION, SOLUTION EPIDURAL; INFILTRATION; INTRACAUDAL; PERINEURAL AS NEEDED
Status: DISCONTINUED | OUTPATIENT
Start: 2017-01-01 | End: 2017-01-01 | Stop reason: HOSPADM

## 2017-01-01 RX ORDER — SODIUM CHLORIDE 0.9 % (FLUSH) 0.9 %
5-10 SYRINGE (ML) INJECTION AS NEEDED
Status: DISCONTINUED | OUTPATIENT
Start: 2017-01-01 | End: 2017-01-01

## 2017-01-01 RX ORDER — DIPHENHYDRAMINE HCL 25 MG
25 CAPSULE ORAL
Status: DISCONTINUED | OUTPATIENT
Start: 2017-01-01 | End: 2017-01-01

## 2017-01-01 RX ORDER — SODIUM CHLORIDE 9 MG/ML
100 INJECTION, SOLUTION INTRAVENOUS CONTINUOUS
Status: DISCONTINUED | OUTPATIENT
Start: 2017-01-01 | End: 2017-01-01

## 2017-01-01 RX ORDER — DEXTROSE MONOHYDRATE 100 MG/ML
50 INJECTION, SOLUTION INTRAVENOUS CONTINUOUS
Status: DISCONTINUED | OUTPATIENT
Start: 2017-01-01 | End: 2017-01-01 | Stop reason: HOSPADM

## 2017-01-01 RX ORDER — DEXTROSE 50 % IN WATER (D50W) INTRAVENOUS SYRINGE
Status: COMPLETED
Start: 2017-01-01 | End: 2017-01-01

## 2017-01-01 RX ORDER — INSULIN LISPRO 100 [IU]/ML
INJECTION, SOLUTION INTRAVENOUS; SUBCUTANEOUS EVERY 6 HOURS
Status: DISCONTINUED | OUTPATIENT
Start: 2017-01-01 | End: 2017-01-01 | Stop reason: HOSPADM

## 2017-01-01 RX ORDER — SODIUM CHLORIDE, SODIUM LACTATE, POTASSIUM CHLORIDE, CALCIUM CHLORIDE 600; 310; 30; 20 MG/100ML; MG/100ML; MG/100ML; MG/100ML
INJECTION, SOLUTION INTRAVENOUS
Status: DISCONTINUED | OUTPATIENT
Start: 2017-01-01 | End: 2017-01-01 | Stop reason: HOSPADM

## 2017-01-01 RX ORDER — HYDROMORPHONE HYDROCHLORIDE 1 MG/ML
1-2 INJECTION, SOLUTION INTRAMUSCULAR; INTRAVENOUS; SUBCUTANEOUS
Status: DISCONTINUED | OUTPATIENT
Start: 2017-01-01 | End: 2017-01-01 | Stop reason: HOSPADM

## 2017-01-01 RX ORDER — MIDAZOLAM HYDROCHLORIDE 1 MG/ML
INJECTION, SOLUTION INTRAMUSCULAR; INTRAVENOUS
Status: DISPENSED
Start: 2017-01-01 | End: 2017-01-01

## 2017-01-01 RX ORDER — PROPOFOL 10 MG/ML
INJECTION, EMULSION INTRAVENOUS
Status: COMPLETED
Start: 2017-01-01 | End: 2017-01-01

## 2017-01-01 RX ORDER — SODIUM CHLORIDE AND POTASSIUM CHLORIDE .9; .15 G/100ML; G/100ML
SOLUTION INTRAVENOUS CONTINUOUS
Status: DISCONTINUED | OUTPATIENT
Start: 2017-01-01 | End: 2017-01-01 | Stop reason: HOSPADM

## 2017-01-01 RX ORDER — MIDAZOLAM HYDROCHLORIDE 1 MG/ML
INJECTION, SOLUTION INTRAMUSCULAR; INTRAVENOUS
Status: COMPLETED
Start: 2017-01-01 | End: 2017-01-01

## 2017-01-01 RX ORDER — SODIUM CHLORIDE 0.9 % (FLUSH) 0.9 %
5-10 SYRINGE (ML) INJECTION EVERY 8 HOURS
Status: DISCONTINUED | OUTPATIENT
Start: 2017-01-01 | End: 2017-01-01

## 2017-01-01 RX ORDER — ALVIMOPAN 12 MG/1
12 CAPSULE ORAL 2 TIMES DAILY
Status: DISCONTINUED | OUTPATIENT
Start: 2017-01-01 | End: 2017-01-01

## 2017-01-01 RX ORDER — HYDROMORPHONE HYDROCHLORIDE 1 MG/ML
.2-.5 INJECTION, SOLUTION INTRAMUSCULAR; INTRAVENOUS; SUBCUTANEOUS
Status: DISCONTINUED | OUTPATIENT
Start: 2017-01-01 | End: 2017-01-01

## 2017-01-01 RX ORDER — HYDROMORPHONE HYDROCHLORIDE 1 MG/ML
.5-1 INJECTION, SOLUTION INTRAMUSCULAR; INTRAVENOUS; SUBCUTANEOUS
Status: DISCONTINUED | OUTPATIENT
Start: 2017-01-01 | End: 2017-01-01

## 2017-01-01 RX ORDER — SODIUM CHLORIDE, SODIUM LACTATE, POTASSIUM CHLORIDE, CALCIUM CHLORIDE 600; 310; 30; 20 MG/100ML; MG/100ML; MG/100ML; MG/100ML
25 INJECTION, SOLUTION INTRAVENOUS CONTINUOUS
Status: CANCELLED | OUTPATIENT
Start: 2017-01-01

## 2017-01-01 RX ORDER — LEVALBUTEROL INHALATION SOLUTION 0.63 MG/3ML
0.63 SOLUTION RESPIRATORY (INHALATION)
Status: DISCONTINUED | OUTPATIENT
Start: 2017-01-01 | End: 2017-01-01

## 2017-01-01 RX ORDER — SODIUM BICARBONATE 1 MEQ/ML
150 SYRINGE (ML) INTRAVENOUS ONCE
Status: COMPLETED | OUTPATIENT
Start: 2017-01-01 | End: 2017-01-01

## 2017-01-01 RX ORDER — ONDANSETRON 2 MG/ML
INJECTION INTRAMUSCULAR; INTRAVENOUS AS NEEDED
Status: DISCONTINUED | OUTPATIENT
Start: 2017-01-01 | End: 2017-01-01 | Stop reason: HOSPADM

## 2017-01-01 RX ORDER — LIDOCAINE HYDROCHLORIDE 10 MG/ML
0.1 INJECTION, SOLUTION EPIDURAL; INFILTRATION; INTRACAUDAL; PERINEURAL AS NEEDED
Status: DISCONTINUED | OUTPATIENT
Start: 2017-01-01 | End: 2017-01-01

## 2017-01-01 RX ORDER — PROPOFOL 10 MG/ML
INJECTION, EMULSION INTRAVENOUS AS NEEDED
Status: DISCONTINUED | OUTPATIENT
Start: 2017-01-01 | End: 2017-01-01 | Stop reason: HOSPADM

## 2017-01-01 RX ORDER — NALOXONE HYDROCHLORIDE 0.4 MG/ML
0.4 INJECTION, SOLUTION INTRAMUSCULAR; INTRAVENOUS; SUBCUTANEOUS
Status: DISCONTINUED | OUTPATIENT
Start: 2017-01-01 | End: 2017-01-01 | Stop reason: HOSPADM

## 2017-01-01 RX ORDER — MUPIROCIN 20 MG/G
OINTMENT TOPICAL 2 TIMES DAILY
Status: DISCONTINUED | OUTPATIENT
Start: 2017-01-01 | End: 2017-01-01 | Stop reason: HOSPADM

## 2017-01-01 RX ORDER — VALSARTAN 40 MG/1
40 TABLET ORAL DAILY
Status: DISCONTINUED | OUTPATIENT
Start: 2017-01-01 | End: 2017-01-01

## 2017-01-01 RX ORDER — ALVIMOPAN 12 MG/1
12 CAPSULE ORAL ONCE
Status: COMPLETED | OUTPATIENT
Start: 2017-01-01 | End: 2017-01-01

## 2017-01-01 RX ORDER — NEOSTIGMINE METHYLSULFATE 1 MG/ML
INJECTION INTRAVENOUS AS NEEDED
Status: DISCONTINUED | OUTPATIENT
Start: 2017-01-01 | End: 2017-01-01 | Stop reason: HOSPADM

## 2017-01-01 RX ORDER — EPINEPHRINE 0.1 MG/ML
1 INJECTION INTRACARDIAC; INTRAVENOUS
Status: DISCONTINUED | OUTPATIENT
Start: 2017-01-01 | End: 2017-01-01 | Stop reason: HOSPADM

## 2017-01-01 RX ORDER — DEXAMETHASONE SODIUM PHOSPHATE 100 MG/10ML
INJECTION INTRAMUSCULAR; INTRAVENOUS AS NEEDED
Status: DISCONTINUED | OUTPATIENT
Start: 2017-01-01 | End: 2017-01-01 | Stop reason: HOSPADM

## 2017-01-01 RX ORDER — PROPOFOL 10 MG/ML
5-50 VIAL (ML) INTRAVENOUS
Status: DISCONTINUED | OUTPATIENT
Start: 2017-01-01 | End: 2017-01-01 | Stop reason: HOSPADM

## 2017-01-01 RX ORDER — METOCLOPRAMIDE HYDROCHLORIDE 5 MG/ML
5 INJECTION INTRAMUSCULAR; INTRAVENOUS EVERY 6 HOURS
Status: DISCONTINUED | OUTPATIENT
Start: 2017-01-01 | End: 2017-01-01

## 2017-01-01 RX ORDER — FLUTICASONE PROPIONATE 50 MCG
SPRAY, SUSPENSION (ML) NASAL
Qty: 1 BOTTLE | Refills: 0 | Status: SHIPPED | OUTPATIENT
Start: 2017-01-01 | End: 2017-01-01 | Stop reason: CLARIF

## 2017-01-01 RX ORDER — FENTANYL CITRATE 50 UG/ML
INJECTION, SOLUTION INTRAMUSCULAR; INTRAVENOUS AS NEEDED
Status: DISCONTINUED | OUTPATIENT
Start: 2017-01-01 | End: 2017-01-01 | Stop reason: HOSPADM

## 2017-01-01 RX ORDER — ALBUMIN HUMAN 50 G/1000ML
SOLUTION INTRAVENOUS AS NEEDED
Status: DISCONTINUED | OUTPATIENT
Start: 2017-01-01 | End: 2017-01-01 | Stop reason: HOSPADM

## 2017-01-01 RX ORDER — HYDROMORPHONE HYDROCHLORIDE 1 MG/ML
1-2 INJECTION, SOLUTION INTRAMUSCULAR; INTRAVENOUS; SUBCUTANEOUS
Status: DISCONTINUED | OUTPATIENT
Start: 2017-01-01 | End: 2017-01-01

## 2017-01-01 RX ORDER — ENOXAPARIN SODIUM 100 MG/ML
40 INJECTION SUBCUTANEOUS EVERY 24 HOURS
Status: DISCONTINUED | OUTPATIENT
Start: 2017-01-01 | End: 2017-01-01

## 2017-01-01 RX ORDER — SODIUM CHLORIDE 0.9 % (FLUSH) 0.9 %
10 SYRINGE (ML) INJECTION
Status: COMPLETED | OUTPATIENT
Start: 2017-01-01 | End: 2017-01-01

## 2017-01-01 RX ORDER — SUCCINYLCHOLINE CHLORIDE 20 MG/ML
INJECTION INTRAMUSCULAR; INTRAVENOUS
Status: DISPENSED
Start: 2017-01-01 | End: 2017-01-01

## 2017-01-01 RX ORDER — ONDANSETRON 2 MG/ML
4 INJECTION INTRAMUSCULAR; INTRAVENOUS AS NEEDED
Status: DISCONTINUED | OUTPATIENT
Start: 2017-01-01 | End: 2017-01-01

## 2017-01-01 RX ORDER — CALCIUM CHLORIDE INJECTION 100 MG/ML
INJECTION, SOLUTION INTRAVENOUS
Status: DISPENSED
Start: 2017-01-01 | End: 2017-01-01

## 2017-01-01 RX ORDER — METRONIDAZOLE 500 MG/100ML
500 INJECTION, SOLUTION INTRAVENOUS ONCE
Status: COMPLETED | OUTPATIENT
Start: 2017-01-01 | End: 2017-01-01

## 2017-01-01 RX ORDER — HEPARIN SODIUM 5000 [USP'U]/ML
5000 INJECTION, SOLUTION INTRAVENOUS; SUBCUTANEOUS EVERY 8 HOURS
Status: DISCONTINUED | OUTPATIENT
Start: 2017-01-01 | End: 2017-01-01 | Stop reason: HOSPADM

## 2017-01-01 RX ORDER — ACETAMINOPHEN 325 MG/1
650 TABLET ORAL
Status: DISCONTINUED | OUTPATIENT
Start: 2017-01-01 | End: 2017-01-01 | Stop reason: HOSPADM

## 2017-01-01 RX ORDER — VECURONIUM BROMIDE FOR INJECTION 1 MG/ML
INJECTION, POWDER, LYOPHILIZED, FOR SOLUTION INTRAVENOUS AS NEEDED
Status: DISCONTINUED | OUTPATIENT
Start: 2017-01-01 | End: 2017-01-01 | Stop reason: HOSPADM

## 2017-01-01 RX ORDER — ALVIMOPAN 12 MG/1
12 CAPSULE ORAL ONCE
Status: CANCELLED | OUTPATIENT
Start: 2017-01-01 | End: 2017-01-01

## 2017-01-01 RX ORDER — SODIUM BICARBONATE 1 MEQ/ML
SYRINGE (ML) INTRAVENOUS
Status: DISPENSED
Start: 2017-01-01 | End: 2017-01-01

## 2017-01-01 RX ORDER — SODIUM CHLORIDE 0.9 % (FLUSH) 0.9 %
5-10 SYRINGE (ML) INJECTION EVERY 8 HOURS
Status: DISCONTINUED | OUTPATIENT
Start: 2017-01-01 | End: 2017-01-01 | Stop reason: HOSPADM

## 2017-01-01 RX ORDER — DEXTROSE 50 % IN WATER (D50W) INTRAVENOUS SYRINGE
Status: DISPENSED
Start: 2017-01-01 | End: 2017-01-01

## 2017-01-01 RX ORDER — FUROSEMIDE 10 MG/ML
20 INJECTION INTRAMUSCULAR; INTRAVENOUS ONCE
Status: COMPLETED | OUTPATIENT
Start: 2017-01-01 | End: 2017-01-01

## 2017-01-01 RX ORDER — ONDANSETRON 2 MG/ML
1 INJECTION INTRAMUSCULAR; INTRAVENOUS
Status: DISCONTINUED | OUTPATIENT
Start: 2017-01-01 | End: 2017-01-01

## 2017-01-01 RX ORDER — SODIUM CHLORIDE 9 MG/ML
75 INJECTION, SOLUTION INTRAVENOUS CONTINUOUS
Status: DISCONTINUED | OUTPATIENT
Start: 2017-01-01 | End: 2017-01-01 | Stop reason: HOSPADM

## 2017-01-01 RX ORDER — LIDOCAINE HYDROCHLORIDE 10 MG/ML
0.1 INJECTION, SOLUTION EPIDURAL; INFILTRATION; INTRACAUDAL; PERINEURAL AS NEEDED
Status: DISCONTINUED | OUTPATIENT
Start: 2017-01-01 | End: 2017-01-01 | Stop reason: HOSPADM

## 2017-01-01 RX ORDER — GLYCOPYRROLATE 0.2 MG/ML
INJECTION INTRAMUSCULAR; INTRAVENOUS AS NEEDED
Status: DISCONTINUED | OUTPATIENT
Start: 2017-01-01 | End: 2017-01-01 | Stop reason: HOSPADM

## 2017-01-01 RX ORDER — FLUMAZENIL 0.1 MG/ML
0.2 INJECTION INTRAVENOUS
Status: DISCONTINUED | OUTPATIENT
Start: 2017-01-01 | End: 2017-01-01 | Stop reason: HOSPADM

## 2017-01-01 RX ORDER — ENOXAPARIN SODIUM 100 MG/ML
30 INJECTION SUBCUTANEOUS EVERY 24 HOURS
Status: DISCONTINUED | OUTPATIENT
Start: 2017-01-01 | End: 2017-01-01

## 2017-01-01 RX ORDER — SODIUM BICARBONATE 1 MEQ/ML
100 SYRINGE (ML) INTRAVENOUS ONCE
Status: COMPLETED | OUTPATIENT
Start: 2017-01-01 | End: 2017-01-01

## 2017-01-01 RX ORDER — CHLORHEXIDINE GLUCONATE 1.2 MG/ML
15 RINSE ORAL EVERY 12 HOURS
Status: DISCONTINUED | OUTPATIENT
Start: 2017-01-01 | End: 2017-01-01 | Stop reason: HOSPADM

## 2017-01-01 RX ORDER — CEFAZOLIN SODIUM IN 0.9 % NACL 2 G/100 ML
2 PLASTIC BAG, INJECTION (ML) INTRAVENOUS ONCE
Status: CANCELLED | OUTPATIENT
Start: 2017-01-01 | End: 2017-01-01

## 2017-01-01 RX ORDER — SODIUM CHLORIDE 0.9 % (FLUSH) 0.9 %
5-10 SYRINGE (ML) INJECTION AS NEEDED
Status: DISCONTINUED | OUTPATIENT
Start: 2017-01-01 | End: 2017-01-01 | Stop reason: HOSPADM

## 2017-01-01 RX ORDER — SODIUM CHLORIDE 9 MG/ML
250 INJECTION, SOLUTION INTRAVENOUS AS NEEDED
Status: DISCONTINUED | OUTPATIENT
Start: 2017-01-01 | End: 2017-01-01 | Stop reason: HOSPADM

## 2017-01-01 RX ORDER — MORPHINE SULFATE 10 MG/ML
2 INJECTION, SOLUTION INTRAMUSCULAR; INTRAVENOUS
Status: DISCONTINUED | OUTPATIENT
Start: 2017-01-01 | End: 2017-01-01

## 2017-01-01 RX ORDER — MAGNESIUM SULFATE 100 %
4 CRYSTALS MISCELLANEOUS AS NEEDED
Status: DISCONTINUED | OUTPATIENT
Start: 2017-01-01 | End: 2017-01-01 | Stop reason: HOSPADM

## 2017-01-01 RX ORDER — FENTANYL CITRATE 50 UG/ML
25 INJECTION, SOLUTION INTRAMUSCULAR; INTRAVENOUS
Status: DISCONTINUED | OUTPATIENT
Start: 2017-01-01 | End: 2017-01-01

## 2017-01-01 RX ORDER — ONDANSETRON 2 MG/ML
4 INJECTION INTRAMUSCULAR; INTRAVENOUS
Status: DISCONTINUED | OUTPATIENT
Start: 2017-01-01 | End: 2017-01-01 | Stop reason: HOSPADM

## 2017-01-01 RX ORDER — DIPHENHYDRAMINE HYDROCHLORIDE 50 MG/ML
12.5 INJECTION, SOLUTION INTRAMUSCULAR; INTRAVENOUS AS NEEDED
Status: DISCONTINUED | OUTPATIENT
Start: 2017-01-01 | End: 2017-01-01

## 2017-01-01 RX ORDER — CEFAZOLIN SODIUM IN 0.9 % NACL 2 G/100 ML
2 PLASTIC BAG, INJECTION (ML) INTRAVENOUS ONCE
Status: DISCONTINUED | OUTPATIENT
Start: 2017-01-01 | End: 2017-01-01 | Stop reason: HOSPADM

## 2017-01-01 RX ORDER — SODIUM CHLORIDE, SODIUM LACTATE, POTASSIUM CHLORIDE, CALCIUM CHLORIDE 600; 310; 30; 20 MG/100ML; MG/100ML; MG/100ML; MG/100ML
25 INJECTION, SOLUTION INTRAVENOUS CONTINUOUS
Status: DISCONTINUED | OUTPATIENT
Start: 2017-01-01 | End: 2017-01-01

## 2017-01-01 RX ORDER — BUPIVACAINE HYDROCHLORIDE AND EPINEPHRINE 5; 5 MG/ML; UG/ML
INJECTION, SOLUTION EPIDURAL; INTRACAUDAL; PERINEURAL AS NEEDED
Status: DISCONTINUED | OUTPATIENT
Start: 2017-01-01 | End: 2017-01-01 | Stop reason: HOSPADM

## 2017-01-01 RX ORDER — FENTANYL CITRATE 50 UG/ML
50 INJECTION, SOLUTION INTRAMUSCULAR; INTRAVENOUS
Status: DISCONTINUED | OUTPATIENT
Start: 2017-01-01 | End: 2017-01-01

## 2017-01-01 RX ORDER — METOPROLOL TARTRATE 5 MG/5ML
5 INJECTION INTRAVENOUS EVERY 6 HOURS
Status: DISCONTINUED | OUTPATIENT
Start: 2017-01-01 | End: 2017-01-01

## 2017-01-01 RX ORDER — TRIAMCINOLONE ACETONIDE 55 UG/1
2 SPRAY, METERED NASAL DAILY
COMMUNITY

## 2017-01-01 RX ORDER — HYDROMORPHONE HYDROCHLORIDE 1 MG/ML
0.2 INJECTION, SOLUTION INTRAMUSCULAR; INTRAVENOUS; SUBCUTANEOUS
Status: DISCONTINUED | OUTPATIENT
Start: 2017-01-01 | End: 2017-01-01

## 2017-01-01 RX ORDER — SODIUM CHLORIDE 9 MG/ML
50 INJECTION, SOLUTION INTRAVENOUS
Status: COMPLETED | OUTPATIENT
Start: 2017-01-01 | End: 2017-01-01

## 2017-01-01 RX ORDER — BARIUM SULFATE 20 MG/ML
900 SUSPENSION ORAL
Status: COMPLETED | OUTPATIENT
Start: 2017-01-01 | End: 2017-01-01

## 2017-01-01 RX ORDER — FENTANYL CITRATE 50 UG/ML
50 INJECTION, SOLUTION INTRAMUSCULAR; INTRAVENOUS AS NEEDED
Status: DISCONTINUED | OUTPATIENT
Start: 2017-01-01 | End: 2017-01-01 | Stop reason: HOSPADM

## 2017-01-01 RX ORDER — MIDAZOLAM HYDROCHLORIDE 1 MG/ML
INJECTION, SOLUTION INTRAMUSCULAR; INTRAVENOUS AS NEEDED
Status: DISCONTINUED | OUTPATIENT
Start: 2017-01-01 | End: 2017-01-01 | Stop reason: HOSPADM

## 2017-01-01 RX ORDER — SODIUM BICARBONATE 1 MEQ/ML
SYRINGE (ML) INTRAVENOUS
Status: DISCONTINUED
Start: 2017-01-01 | End: 2017-01-01 | Stop reason: HOSPADM

## 2017-01-01 RX ORDER — MIDAZOLAM HYDROCHLORIDE 1 MG/ML
0.5 INJECTION, SOLUTION INTRAMUSCULAR; INTRAVENOUS
Status: DISCONTINUED | OUTPATIENT
Start: 2017-01-01 | End: 2017-01-01

## 2017-01-01 RX ORDER — FENTANYL CITRATE 50 UG/ML
25 INJECTION, SOLUTION INTRAMUSCULAR; INTRAVENOUS
Status: DISCONTINUED | OUTPATIENT
Start: 2017-01-01 | End: 2017-01-01 | Stop reason: HOSPADM

## 2017-01-01 RX ORDER — HYDROCODONE BITARTRATE AND ACETAMINOPHEN 5; 325 MG/1; MG/1
1-2 TABLET ORAL
Status: DISCONTINUED | OUTPATIENT
Start: 2017-01-01 | End: 2017-01-01

## 2017-01-01 RX ORDER — DEXTROMETHORPHAN/PSEUDOEPHED 2.5-7.5/.8
1.2 DROPS ORAL
Status: DISCONTINUED | OUTPATIENT
Start: 2017-01-01 | End: 2017-01-01 | Stop reason: HOSPADM

## 2017-01-01 RX ORDER — CEPHALEXIN 250 MG/1
250 CAPSULE ORAL EVERY 6 HOURS
Qty: 40 CAP | Refills: 0 | Status: SHIPPED | OUTPATIENT
Start: 2017-01-01 | End: 2017-01-01

## 2017-01-01 RX ORDER — TRIAMCINOLONE ACETONIDE 1 MG/G
CREAM TOPICAL 2 TIMES DAILY
COMMUNITY
End: 2017-01-01

## 2017-01-01 RX ORDER — SUCCINYLCHOLINE CHLORIDE 20 MG/ML
INJECTION INTRAMUSCULAR; INTRAVENOUS AS NEEDED
Status: DISCONTINUED | OUTPATIENT
Start: 2017-01-01 | End: 2017-01-01 | Stop reason: HOSPADM

## 2017-01-01 RX ORDER — ATROPINE SULFATE 0.1 MG/ML
0.5 INJECTION INTRAVENOUS
Status: DISCONTINUED | OUTPATIENT
Start: 2017-01-01 | End: 2017-01-01 | Stop reason: HOSPADM

## 2017-01-01 RX ORDER — DEXTROSE MONOHYDRATE 100 MG/ML
125-250 INJECTION, SOLUTION INTRAVENOUS AS NEEDED
Status: DISCONTINUED | OUTPATIENT
Start: 2017-01-01 | End: 2017-01-01 | Stop reason: HOSPADM

## 2017-01-01 RX ORDER — CEFAZOLIN SODIUM 1 G/3ML
INJECTION, POWDER, FOR SOLUTION INTRAMUSCULAR; INTRAVENOUS AS NEEDED
Status: DISCONTINUED | OUTPATIENT
Start: 2017-01-01 | End: 2017-01-01 | Stop reason: HOSPADM

## 2017-01-01 RX ORDER — FENTANYL CITRATE 50 UG/ML
50 INJECTION, SOLUTION INTRAMUSCULAR; INTRAVENOUS
Status: DISCONTINUED | OUTPATIENT
Start: 2017-01-01 | End: 2017-01-01 | Stop reason: HOSPADM

## 2017-01-01 RX ORDER — ONDANSETRON 2 MG/ML
1 INJECTION INTRAMUSCULAR; INTRAVENOUS ONCE
Status: DISCONTINUED | OUTPATIENT
Start: 2017-01-01 | End: 2017-01-01

## 2017-01-01 RX ORDER — HYDROMORPHONE HYDROCHLORIDE 2 MG/ML
INJECTION, SOLUTION INTRAMUSCULAR; INTRAVENOUS; SUBCUTANEOUS AS NEEDED
Status: DISCONTINUED | OUTPATIENT
Start: 2017-01-01 | End: 2017-01-01 | Stop reason: HOSPADM

## 2017-01-01 RX ORDER — SODIUM CHLORIDE AND POTASSIUM CHLORIDE .9; .15 G/100ML; G/100ML
SOLUTION INTRAVENOUS
Status: DISPENSED
Start: 2017-01-01 | End: 2017-01-01

## 2017-01-01 RX ORDER — SODIUM CHLORIDE, SODIUM LACTATE, POTASSIUM CHLORIDE, CALCIUM CHLORIDE 600; 310; 30; 20 MG/100ML; MG/100ML; MG/100ML; MG/100ML
125 INJECTION, SOLUTION INTRAVENOUS CONTINUOUS
Status: DISCONTINUED | OUTPATIENT
Start: 2017-01-01 | End: 2017-01-01

## 2017-01-01 RX ORDER — SODIUM CHLORIDE 9 MG/ML
INJECTION, SOLUTION INTRAVENOUS
Status: DISCONTINUED | OUTPATIENT
Start: 2017-01-01 | End: 2017-01-01 | Stop reason: HOSPADM

## 2017-01-01 RX ORDER — METRONIDAZOLE 500 MG/100ML
500 INJECTION, SOLUTION INTRAVENOUS ONCE
Status: CANCELLED | OUTPATIENT
Start: 2017-01-01 | End: 2017-01-01

## 2017-01-01 RX ORDER — HYDROCORTISONE SODIUM SUCCINATE 100 MG/2ML
100 INJECTION, POWDER, FOR SOLUTION INTRAMUSCULAR; INTRAVENOUS EVERY 8 HOURS
Status: DISCONTINUED | OUTPATIENT
Start: 2017-01-01 | End: 2017-01-01 | Stop reason: HOSPADM

## 2017-01-01 RX ORDER — MIDAZOLAM HYDROCHLORIDE 1 MG/ML
.25-5 INJECTION, SOLUTION INTRAMUSCULAR; INTRAVENOUS
Status: DISCONTINUED | OUTPATIENT
Start: 2017-01-01 | End: 2017-01-01 | Stop reason: HOSPADM

## 2017-01-01 RX ORDER — ROCURONIUM BROMIDE 10 MG/ML
INJECTION, SOLUTION INTRAVENOUS AS NEEDED
Status: DISCONTINUED | OUTPATIENT
Start: 2017-01-01 | End: 2017-01-01 | Stop reason: HOSPADM

## 2017-01-01 RX ORDER — INDOCYANINE GREEN AND WATER 25 MG
KIT INJECTION AS NEEDED
Status: DISCONTINUED | OUTPATIENT
Start: 2017-01-01 | End: 2017-01-01 | Stop reason: HOSPADM

## 2017-01-01 RX ADMIN — SODIUM CHLORIDE 200 MG: 900 INJECTION, SOLUTION INTRAVENOUS at 05:47

## 2017-01-01 RX ADMIN — SODIUM CHLORIDE, SODIUM LACTATE, POTASSIUM CHLORIDE, AND CALCIUM CHLORIDE 125 ML/HR: 600; 310; 30; 20 INJECTION, SOLUTION INTRAVENOUS at 09:12

## 2017-01-01 RX ADMIN — SODIUM CHLORIDE 75 ML/HR: 900 INJECTION, SOLUTION INTRAVENOUS at 11:22

## 2017-01-01 RX ADMIN — PROPOFOL 320 MG: 10 INJECTION, EMULSION INTRAVENOUS at 12:06

## 2017-01-01 RX ADMIN — SODIUM CHLORIDE 500 ML: 900 INJECTION, SOLUTION INTRAVENOUS at 02:03

## 2017-01-01 RX ADMIN — LIDOCAINE HYDROCHLORIDE 20 MG: 20 INJECTION, SOLUTION EPIDURAL; INFILTRATION; INTRACAUDAL; PERINEURAL at 08:04

## 2017-01-01 RX ADMIN — PHENYLEPHRINE HYDROCHLORIDE 75 MCG/MIN: 10 INJECTION INTRAVENOUS at 02:03

## 2017-01-01 RX ADMIN — PIPERACILLIN SODIUM,TAZOBACTAM SODIUM 3.38 G: 3; .375 INJECTION, POWDER, FOR SOLUTION INTRAVENOUS at 05:30

## 2017-01-01 RX ADMIN — FENTANYL CITRATE 25 MCG: 50 INJECTION, SOLUTION INTRAMUSCULAR; INTRAVENOUS at 12:55

## 2017-01-01 RX ADMIN — PROPOFOL 150 MG: 10 INJECTION, EMULSION INTRAVENOUS at 08:03

## 2017-01-01 RX ADMIN — Medication 10 ML: at 15:02

## 2017-01-01 RX ADMIN — ALBUMIN HUMAN 250 ML: 50 SOLUTION INTRAVENOUS at 06:23

## 2017-01-01 RX ADMIN — NOREPINEPHRINE BITARTRATE 30 MCG/MIN: 1 INJECTION, SOLUTION, CONCENTRATE INTRAVENOUS at 10:45

## 2017-01-01 RX ADMIN — ROCURONIUM BROMIDE 40 MG: 10 INJECTION, SOLUTION INTRAVENOUS at 08:33

## 2017-01-01 RX ADMIN — SUCCINYLCHOLINE CHLORIDE 140 MG: 20 INJECTION INTRAMUSCULAR; INTRAVENOUS at 08:03

## 2017-01-01 RX ADMIN — FENTANYL CITRATE 25 MCG: 50 INJECTION, SOLUTION INTRAMUSCULAR; INTRAVENOUS at 12:50

## 2017-01-01 RX ADMIN — Medication 10 ML: at 05:20

## 2017-01-01 RX ADMIN — CALCIUM GLUCONATE 3 G: 94 INJECTION, SOLUTION INTRAVENOUS at 16:07

## 2017-01-01 RX ADMIN — MUPIROCIN: 20 OINTMENT TOPICAL at 21:00

## 2017-01-01 RX ADMIN — INDOCYANINE GREEN AND WATER 5 MG: KIT at 10:06

## 2017-01-01 RX ADMIN — LIDOCAINE HYDROCHLORIDE 40 MG: 20 INJECTION, SOLUTION EPIDURAL; INFILTRATION; INTRACAUDAL; PERINEURAL at 11:44

## 2017-01-01 RX ADMIN — FAMOTIDINE 20 MG: 10 INJECTION, SOLUTION INTRAVENOUS at 09:59

## 2017-01-01 RX ADMIN — VASOPRESSIN 0.01 UNITS/MIN: 20 INJECTION INTRAVENOUS at 18:52

## 2017-01-01 RX ADMIN — HYDROMORPHONE HYDROCHLORIDE 0.4 MG: 2 INJECTION, SOLUTION INTRAMUSCULAR; INTRAVENOUS; SUBCUTANEOUS at 11:47

## 2017-01-01 RX ADMIN — ACETAMINOPHEN 650 MG: 325 TABLET ORAL at 18:57

## 2017-01-01 RX ADMIN — PHENYLEPHRINE HYDROCHLORIDE 225 MCG/MIN: 10 INJECTION INTRAVENOUS at 18:53

## 2017-01-01 RX ADMIN — VASOPRESSIN 0.02 UNITS/MIN: 20 INJECTION INTRAVENOUS at 06:02

## 2017-01-01 RX ADMIN — WATER: 1000 INJECTION, SOLUTION INTRAVENOUS at 13:26

## 2017-01-01 RX ADMIN — PROPOFOL 30 MCG/KG/MIN: 10 INJECTION, EMULSION INTRAVENOUS at 03:30

## 2017-01-01 RX ADMIN — PROPOFOL 50 MG: 10 INJECTION, EMULSION INTRAVENOUS at 03:05

## 2017-01-01 RX ADMIN — HYDROMORPHONE HYDROCHLORIDE 0.4 MG: 2 INJECTION, SOLUTION INTRAMUSCULAR; INTRAVENOUS; SUBCUTANEOUS at 08:33

## 2017-01-01 RX ADMIN — HYDROCORTISONE SODIUM SUCCINATE 100 MG: 100 INJECTION, POWDER, FOR SOLUTION INTRAMUSCULAR; INTRAVENOUS at 00:39

## 2017-01-01 RX ADMIN — FUROSEMIDE 20 MG: 10 INJECTION, SOLUTION INTRAMUSCULAR; INTRAVENOUS at 20:08

## 2017-01-01 RX ADMIN — SODIUM BICARBONATE: 84 INJECTION, SOLUTION INTRAVENOUS at 03:48

## 2017-01-01 RX ADMIN — LIDOCAINE HYDROCHLORIDE 60 MG: 20 INJECTION, SOLUTION EPIDURAL; INFILTRATION; INTRACAUDAL; PERINEURAL at 09:09

## 2017-01-01 RX ADMIN — METOCLOPRAMIDE 5 MG: 5 INJECTION, SOLUTION INTRAMUSCULAR; INTRAVENOUS at 13:58

## 2017-01-01 RX ADMIN — SODIUM BICARBONATE 150 MEQ: 84 INJECTION, SOLUTION INTRAVENOUS at 21:58

## 2017-01-01 RX ADMIN — VECURONIUM BROMIDE FOR INJECTION 10 MG: 1 INJECTION, POWDER, LYOPHILIZED, FOR SOLUTION INTRAVENOUS at 05:17

## 2017-01-01 RX ADMIN — FENTANYL CITRATE 25 MCG: 50 INJECTION, SOLUTION INTRAMUSCULAR; INTRAVENOUS at 13:00

## 2017-01-01 RX ADMIN — FENTANYL CITRATE 100 MCG: 50 INJECTION, SOLUTION INTRAMUSCULAR; INTRAVENOUS at 05:40

## 2017-01-01 RX ADMIN — MIDAZOLAM HYDROCHLORIDE 2 MG: 1 INJECTION, SOLUTION INTRAMUSCULAR; INTRAVENOUS at 07:57

## 2017-01-01 RX ADMIN — PROPOFOL 30 MCG/KG/MIN: 10 INJECTION, EMULSION INTRAVENOUS at 16:12

## 2017-01-01 RX ADMIN — ROCURONIUM BROMIDE 10 MG: 10 INJECTION, SOLUTION INTRAVENOUS at 08:04

## 2017-01-01 RX ADMIN — WATER: 1000 INJECTION, SOLUTION INTRAVENOUS at 21:58

## 2017-01-01 RX ADMIN — DEXTROSE MONOHYDRATE 50 ML/HR: 10 INJECTION, SOLUTION INTRAVENOUS at 00:57

## 2017-01-01 RX ADMIN — CALCIUM GLUCONATE 3 G: 94 INJECTION, SOLUTION INTRAVENOUS at 17:26

## 2017-01-01 RX ADMIN — SODIUM BICARBONATE 150 MEQ: 84 INJECTION, SOLUTION INTRAVENOUS at 00:50

## 2017-01-01 RX ADMIN — Medication 10 ML: at 09:13

## 2017-01-01 RX ADMIN — VECURONIUM BROMIDE FOR INJECTION 3 MG: 1 INJECTION, POWDER, LYOPHILIZED, FOR SOLUTION INTRAVENOUS at 09:31

## 2017-01-01 RX ADMIN — SODIUM BICARBONATE 100 MEQ: 84 INJECTION, SOLUTION INTRAVENOUS at 02:34

## 2017-01-01 RX ADMIN — SODIUM CHLORIDE: 9 INJECTION, SOLUTION INTRAVENOUS at 06:40

## 2017-01-01 RX ADMIN — ONDANSETRON 4 MG: 2 INJECTION INTRAMUSCULAR; INTRAVENOUS at 08:58

## 2017-01-01 RX ADMIN — DEXTRAN 70, AND HYPROMELLOSE 2910 1 DROP: 1; 3 SOLUTION/ DROPS OPHTHALMIC at 22:12

## 2017-01-01 RX ADMIN — NEOSTIGMINE METHYLSULFATE 2 MG: 1 INJECTION INTRAVENOUS at 11:45

## 2017-01-01 RX ADMIN — HYDROMORPHONE HYDROCHLORIDE 0.5 MG: 1 INJECTION, SOLUTION INTRAMUSCULAR; INTRAVENOUS; SUBCUTANEOUS at 17:15

## 2017-01-01 RX ADMIN — VASOPRESSIN 0.03 UNITS/MIN: 20 INJECTION INTRAVENOUS at 23:26

## 2017-01-01 RX ADMIN — HYDROCORTISONE SODIUM SUCCINATE 100 MG: 100 INJECTION, POWDER, FOR SOLUTION INTRAMUSCULAR; INTRAVENOUS at 17:06

## 2017-01-01 RX ADMIN — PROPOFOL 30 MCG/KG/MIN: 10 INJECTION, EMULSION INTRAVENOUS at 09:14

## 2017-01-01 RX ADMIN — HYDROMORPHONE HYDROCHLORIDE 2 MG: 1 INJECTION, SOLUTION INTRAMUSCULAR; INTRAVENOUS; SUBCUTANEOUS at 06:02

## 2017-01-01 RX ADMIN — PIPERACILLIN SODIUM,TAZOBACTAM SODIUM 3.38 G: 3; .375 INJECTION, POWDER, FOR SOLUTION INTRAVENOUS at 21:00

## 2017-01-01 RX ADMIN — PHENYLEPHRINE HYDROCHLORIDE 300 MCG/MIN: 10 INJECTION INTRAVENOUS at 03:49

## 2017-01-01 RX ADMIN — VECURONIUM BROMIDE FOR INJECTION 2 MG: 1 INJECTION, POWDER, LYOPHILIZED, FOR SOLUTION INTRAVENOUS at 10:31

## 2017-01-01 RX ADMIN — PROPOFOL 30 MCG/KG/MIN: 10 INJECTION, EMULSION INTRAVENOUS at 23:28

## 2017-01-01 RX ADMIN — METOCLOPRAMIDE 5 MG: 5 INJECTION, SOLUTION INTRAMUSCULAR; INTRAVENOUS at 18:58

## 2017-01-01 RX ADMIN — FENTANYL CITRATE 100 MCG: 50 INJECTION, SOLUTION INTRAMUSCULAR; INTRAVENOUS at 08:04

## 2017-01-01 RX ADMIN — SODIUM CHLORIDE, SODIUM LACTATE, POTASSIUM CHLORIDE, CALCIUM CHLORIDE: 600; 310; 30; 20 INJECTION, SOLUTION INTRAVENOUS at 10:15

## 2017-01-01 RX ADMIN — Medication 10 ML: at 21:22

## 2017-01-01 RX ADMIN — GLYCOPYRROLATE 0.3 MG: 0.2 INJECTION INTRAMUSCULAR; INTRAVENOUS at 11:45

## 2017-01-01 RX ADMIN — Medication 10 ML: at 20:16

## 2017-01-01 RX ADMIN — NOREPINEPHRINE BITARTRATE 50 MCG/MIN: 1 INJECTION, SOLUTION, CONCENTRATE INTRAVENOUS at 04:00

## 2017-01-01 RX ADMIN — DEXAMETHASONE SODIUM PHOSPHATE 4 MG: 100 INJECTION INTRAMUSCULAR; INTRAVENOUS at 08:58

## 2017-01-01 RX ADMIN — HYDROMORPHONE HYDROCHLORIDE 1 MG: 1 INJECTION, SOLUTION INTRAMUSCULAR; INTRAVENOUS; SUBCUTANEOUS at 22:55

## 2017-01-01 RX ADMIN — SODIUM CHLORIDE, SODIUM LACTATE, POTASSIUM CHLORIDE, CALCIUM CHLORIDE: 600; 310; 30; 20 INJECTION, SOLUTION INTRAVENOUS at 08:00

## 2017-01-01 RX ADMIN — CEFAZOLIN SODIUM 2 G: 1 INJECTION, POWDER, FOR SOLUTION INTRAMUSCULAR; INTRAVENOUS at 08:14

## 2017-01-01 RX ADMIN — IOPAMIDOL 100 ML: 755 INJECTION, SOLUTION INTRAVENOUS at 10:05

## 2017-01-01 RX ADMIN — CHLORHEXIDINE GLUCONATE 15 ML: 1.2 RINSE ORAL at 09:59

## 2017-01-01 RX ADMIN — CHLORHEXIDINE GLUCONATE 15 ML: 1.2 RINSE ORAL at 20:59

## 2017-01-01 RX ADMIN — NOREPINEPHRINE BITARTRATE 60 MCG/MIN: 1 INJECTION, SOLUTION, CONCENTRATE INTRAVENOUS at 23:26

## 2017-01-01 RX ADMIN — HEPARIN SODIUM 5000 UNITS: 5000 INJECTION, SOLUTION INTRAVENOUS; SUBCUTANEOUS at 17:37

## 2017-01-01 RX ADMIN — DEXTROSE MONOHYDRATE 25 G: 25 INJECTION, SOLUTION INTRAVENOUS at 23:43

## 2017-01-01 RX ADMIN — FENTANYL CITRATE 50 MCG: 50 INJECTION, SOLUTION INTRAMUSCULAR; INTRAVENOUS at 06:02

## 2017-01-01 RX ADMIN — ALBUMIN HUMAN 250 ML: 50 SOLUTION INTRAVENOUS at 05:57

## 2017-01-01 RX ADMIN — METRONIDAZOLE 500 MG: 500 INJECTION, SOLUTION INTRAVENOUS at 08:14

## 2017-01-01 RX ADMIN — METOPROLOL TARTRATE 5 MG: 5 INJECTION INTRAVENOUS at 20:14

## 2017-01-01 RX ADMIN — BARIUM SULFATE 900 ML: 21 SUSPENSION ORAL at 10:05

## 2017-01-01 RX ADMIN — NOREPINEPHRINE BITARTRATE 60 MCG/MIN: 1 INJECTION, SOLUTION, CONCENTRATE INTRAVENOUS at 04:17

## 2017-01-01 RX ADMIN — PHENYLEPHRINE HYDROCHLORIDE 270 MCG/MIN: 10 INJECTION INTRAVENOUS at 02:14

## 2017-01-01 RX ADMIN — HEPARIN SODIUM 5000 UNITS: 5000 INJECTION, SOLUTION INTRAVENOUS; SUBCUTANEOUS at 01:01

## 2017-01-01 RX ADMIN — MUPIROCIN: 20 OINTMENT TOPICAL at 21:57

## 2017-01-01 RX ADMIN — IOPAMIDOL 100 ML: 755 INJECTION, SOLUTION INTRAVENOUS at 10:41

## 2017-01-01 RX ADMIN — METOCLOPRAMIDE 5 MG: 5 INJECTION, SOLUTION INTRAMUSCULAR; INTRAVENOUS at 23:21

## 2017-01-01 RX ADMIN — MIDAZOLAM HYDROCHLORIDE 5 MG: 1 INJECTION, SOLUTION INTRAMUSCULAR; INTRAVENOUS at 03:04

## 2017-01-01 RX ADMIN — ENOXAPARIN SODIUM 30 MG: 30 INJECTION SUBCUTANEOUS at 09:58

## 2017-01-01 RX ADMIN — SODIUM CHLORIDE 50 ML/HR: 900 INJECTION, SOLUTION INTRAVENOUS at 10:41

## 2017-01-01 RX ADMIN — SODIUM CHLORIDE, SODIUM LACTATE, POTASSIUM CHLORIDE, AND CALCIUM CHLORIDE 25 ML/HR: 600; 310; 30; 20 INJECTION, SOLUTION INTRAVENOUS at 06:54

## 2017-01-01 RX ADMIN — PIPERACILLIN SODIUM,TAZOBACTAM SODIUM 3.38 G: 3; .375 INJECTION, POWDER, FOR SOLUTION INTRAVENOUS at 09:59

## 2017-01-01 RX ADMIN — ALVIMOPAN 12 MG: 12 CAPSULE ORAL at 07:01

## 2017-01-01 RX ADMIN — NOREPINEPHRINE BITARTRATE 40 MCG/MIN: 1 INJECTION, SOLUTION, CONCENTRATE INTRAVENOUS at 17:18

## 2017-01-01 RX ADMIN — VASOPRESSIN 0.03 UNITS/MIN: 20 INJECTION INTRAVENOUS at 16:07

## 2017-01-01 RX ADMIN — SODIUM CHLORIDE, SODIUM LACTATE, POTASSIUM CHLORIDE, CALCIUM CHLORIDE: 600; 310; 30; 20 INJECTION, SOLUTION INTRAVENOUS at 08:06

## 2017-01-01 RX ADMIN — SODIUM CHLORIDE AND POTASSIUM CHLORIDE: 9; 1.49 INJECTION, SOLUTION INTRAVENOUS at 14:00

## 2017-01-01 RX ADMIN — HYDROCODONE BITARTRATE AND ACETAMINOPHEN 1 TABLET: 5; 325 TABLET ORAL at 21:05

## 2017-01-01 RX ADMIN — SODIUM CHLORIDE: 9 INJECTION, SOLUTION INTRAVENOUS at 05:17

## 2017-01-01 RX ADMIN — SODIUM CHLORIDE 50 ML/HR: 900 INJECTION, SOLUTION INTRAVENOUS at 10:05

## 2017-01-01 RX ADMIN — FENTANYL CITRATE 25 MCG: 50 INJECTION, SOLUTION INTRAMUSCULAR; INTRAVENOUS at 12:45

## 2017-01-01 RX ADMIN — Medication 10 ML: at 10:05

## 2017-01-01 RX ADMIN — SODIUM CHLORIDE 100 MG: 900 INJECTION, SOLUTION INTRAVENOUS at 03:40

## 2017-01-01 RX ADMIN — MUPIROCIN: 20 OINTMENT TOPICAL at 09:59

## 2017-01-01 RX ADMIN — SODIUM BICARBONATE 150 MEQ: 84 INJECTION, SOLUTION INTRAVENOUS at 11:21

## 2017-01-01 RX ADMIN — PHENYLEPHRINE HYDROCHLORIDE 225 MCG/MIN: 10 INJECTION INTRAVENOUS at 11:02

## 2017-01-01 RX ADMIN — Medication 10 ML: at 10:41

## 2017-03-14 NOTE — PROGRESS NOTES
Left message on Dr Kayla Jama to please fax any lab work or office notes to our office for Pt's appointment 3/15/17.

## 2017-03-15 NOTE — PROGRESS NOTES
Sergio Ha is a 80 y.o. female here for to establish care had concerns including New Patient and Blood Clot. HIPAA verified by two patient identifiers. Ms. Sally Hutchinson has a reminder for a \"due or due soon\" health maintenance. I have asked that she contact her primary care provider for follow-up on this health maintenance.

## 2017-05-22 PROBLEM — J06.9 UPPER RESPIRATORY TRACT INFECTION: Status: ACTIVE | Noted: 2017-01-01

## 2017-05-22 PROBLEM — R05.8 COUGH PRODUCTIVE OF PURULENT SPUTUM: Status: ACTIVE | Noted: 2017-01-01

## 2017-05-22 NOTE — PROGRESS NOTES
Hematology Consultation        Patient: Shimon Marie MRN: [id number]  SSN: [id number]    YOB: 1929  Age: 80 y.o. Sex: female      Subjective: Shimon Marie is a 80 y.o. female who I am seeing on request of Dr. Rah Fontenot for VTE. She underwent a right total knee arthroplasty on 09/20/2016. Two days later she started experiencing dyspnea. CTA showed PE in the distal branches of Pulmonary artery. She has been on Apixaban since. She is asymptomatic. She denies bleeding or dyspnea.            Review of Systems:    Constitutional: negative  Eyes: negative  Ears, Nose, Mouth, Throat, and Face: negative  Respiratory: negative  Cardiovascular: negative  Gastrointestinal: negative  Genitourinary:negative  Integument/Breast: negative  Hematologic/Lymphatic: negative  Musculoskeletal:negative  Neurological: negative          Past Medical History:   Diagnosis Date    Arthritis     Hypertension     Ill-defined condition     incontinence    Ill-defined condition     Fractured Cervical disc , no surgery    Other ill-defined conditions(799.89)     spinal stenosis    Other ill-defined conditions(799.89)     elevated cholesterol    Psychiatric disorder     anxiety    Stroke ([facility] [address]     TIA 2009     Past Surgical History:   Procedure Laterality Date    HX BREAST BIOPSY      bilateral    HX CATARACT REMOVAL      bilateral    HX GYN      cyst removed from vagina    HX HEENT      [place] tooth extraction    HX ORTHOPAEDIC  04/2015    Removed cyst from spinal chord      Family History   Problem Relation Age of Onset    Other Mother     Dementia Mother     Heart Attack Mother     Heart Disease Father     Cancer Father     Stomach Cancer Father      Social History   Substance Use Topics    Smoking status: Never Smoker    Smokeless tobacco: Never Used    Alcohol use 0.5 oz/week     1 Shots of liquor per week      Comment: 5 drinks per week      Prior to Admission medications    Medication Sig Start Date End Date Taking? Authorizing Provider   triamcinolone acetonide (KENALOG) 0.1 % topical cream Apply  to affected area two (2) times a day. use thin layer   Yes Historical Provider   apixaban (ELIQUIS) 5 mg tablet Take 5 mg by mouth two (2) times a day. Yes Historical Provider   oxyCODONE IR (ROXICODONE) 5 mg immediate release tablet Take 1-2 Tabs by mouth every three (3) hours as needed. Max Daily Amount: 80 mg. 9/27/16  Yes Cipriano Perez NP   LORazepam (ATIVAN) 0.5 mg tablet Take  by mouth. Yes Historical Provider   valsartan (DIOVAN) 40 mg tablet Take 40 mg by mouth daily. Yes Historical Provider   fluticasone (FLONASE) 50 mcg/actuation nasal spray by Both Nostrils route as needed for Rhinitis. Yes Historical Provider   loratadine (ALLERCLEAR) 10 mg tablet Take 10 mg by mouth as needed. Yes Historical Provider              Allergies   Allergen Reactions    Codeine Nausea and Vomiting           Objective:     Vitals:    03/15/17 1429   BP: 130/73   Pulse: 97   Resp: 18   Temp: 97.6 °F (36.4 °C)   SpO2: 97%   Weight: 171 lb (77.6 kg)   Height: 5' (1.524 m)            Physical Exam:    GENERAL: alert, cooperative  EYE: negative  LYMPHATIC: Cervical, supraclavicular, and axillary nodes normal.   THROAT & NECK: normal and no erythema or exudates noted. LUNG: clear to auscultation bilaterally  HEART: regular rate and rhythm  ABDOMEN: soft, non-tender  EXTREMITIES: no cyanosis or edema  SKIN: Normal.  NEUROLOGIC: negative           CT Results (most recent):    Results from Hospital Encounter encounter on 03/28/17   CTA CHEST W WO CONT   Narrative INDICATION:  Other pulmonary embolism without acute cor pulmonale  follow-up PE. History of hypertension. No additional history. EXAM: CT Angio Chest:    COMPARISON: CTA Chest 9/22/2016.     TECHNIQUE: Unenhanced localizing CT imaging of the pulmonary arteries is  followed by bolus injection of 80 mL Isovue 370 contrast, with thin section  axial Chest CT obtained and 3D image post processing performed including coronal  MIPS. CT dose reduction was achieved through use of a standardized protocol  tailored for this examination and automatic exposure control for dose  modulation. FINDINGS: There is no pulmonary embolism. Previous pulmonary emboli have  resolved. There is no apparent aortic dissection or aneurysm. There is coronary  artery calcification. Lungs show no acute finding or change. There is a diffuse mild pulmonary  fibrosis pattern. There is a 5 mm right lower lobe calcified granuloma. There is  no pneumothorax. There is no pleural or significant pericardial effusion. There  is no significant adenopathy. Impression IMPRESSION:   1. No Acute Pulmonary Embolus. 2. Interval resolution of pulmonary emboli. 2. No acute findings. 3. Mild pulmonary fibrosis pattern. Assessment:     1. B/L Pulmonary embolism    > 1st episode/event of occurred in 09/2016- provoked by right knee arthroplasty    Currently on Apixaban  No bleeding  Has completed 6 months of systemic anticoagulation    The risk of second event in the next 1 year is around 10%. If she stays on systemic anticoagulation then the risk would be < 1%. If she chooses to be on a ASA, the risk of recurrent thrombosis is around 4%. I had an extended risk benefit discussions with the patient. She vocalized understanding. After weighing the pros and cons, she prefers to be off anticoagulation. She is comfortable with this decision. Most recently there has two studies published in NEJM which have looked at ASA for prevention of recurrent venous thrombosis.   Warfarin and Aspirin (WARFASA) study and the Aspirin to Prevent Recurrent Venous Thromboembolism (ASPIRE) study, evaluated aspirin as compared with placebo in patients with unprovoked venous thromboembolism who had completed initial treatment with heparin followed by warfarin for a minimum of 6 weeks (most received therapy for at least 3 months). When data from these two trials were pooled, there was a 32% reduction in the rate of recurrence of venous thromboembolism (hazard ratio, 0.68; 95% CI, 0.51 to 0.90; P = 0.007) and a 34% reduction in the rate of major vascular events (hazard ratio, 0.66; 95% CI, 0.51 to 0.86; P = 0.002). Thus I have recommended the patient to start low dose ASA (81 mg/day) from now on. Plan:       1. Ok to stop systemic anticoagulation  2. Start ASA 81/162 mg daily  3. CT PE protocol  4. Return as needed        Signed by: Angelo Boast, MD                     May 21, 2017         CC.  Markel Hutchinson MD

## 2017-05-22 NOTE — PATIENT INSTRUCTIONS
Allergies: Care Instructions  Your Care Instructions  Allergies occur when your body's defense system (immune system) overreacts to certain substances. The immune system treats a harmless substance as if it were a harmful germ or virus. Many things can cause this overreaction, including pollens, medicine, food, dust, animal dander, and mold. Allergies can be mild or severe. Mild allergies can be managed with home treatment. But medicine may be needed to prevent problems. Managing your allergies is an important part of staying healthy. Your doctor may suggest that you have allergy testing to help find out what is causing your allergies. When you know what things trigger your symptoms, you can avoid them. This can prevent allergy symptoms and other health problems. For severe allergies that cause reactions that affect your whole body (anaphylactic reactions), your doctor may prescribe a shot of epinephrine to carry with you in case you have a severe reaction. Learn how to give yourself the shot and keep it with you at all times. Make sure it is not . Follow-up care is a key part of your treatment and safety. Be sure to make and go to all appointments, and call your doctor if you are having problems. It's also a good idea to know your test results and keep a list of the medicines you take. How can you care for yourself at home? · If you have been told by your doctor that dust or dust mites are causing your allergy, decrease the dust around your bed:  [facility] [employer] sheets, pillowcases, and other bedding in hot water every week. ¨ Use dust-proof covers for pillows, duvets, and mattresses. Avoid plastic covers because they tear easily and do not \"breathe. \" Wash as instructed on the label. ¨ Do not use any blankets and pillows that you do not need. ¨ Use blankets that you can wash in your washing machine. ¨ Consider removing drapes and carpets, which attract and hold dust, from your bedroom.   · If you are allergic to house dust and mites, do not use home humidifiers. Your doctor can suggest ways you can control dust and mites. · Look for signs of cockroaches. Cockroaches cause allergic reactions. Use cockroach baits to get rid of them. Then, clean your home well. Cockroaches like areas where grocery bags, newspapers, empty bottles, or cardboard boxes are stored. Do not keep these inside your home, and keep trash and food containers sealed. Seal off any spots where cockroaches might enter your home. · If you are allergic to mold, get rid of furniture, rugs, and drapes that smell musty. Check for mold in the bathroom. · If you are allergic to outdoor pollen or mold spores, use air-conditioning. Change or clean all filters every month. Keep windows closed. · If you are allergic to pollen, stay inside when pollen counts are high. Use a vacuum  with a HEPA filter or a double-thickness filter at least two times each week. · Stay inside when air pollution is bad. Avoid paint fumes, perfumes, and other strong odors. · Avoid conditions that make your allergies worse. Stay away from smoke. Do not smoke or let anyone else smoke in your house. Do not use fireplaces or wood-burning stoves. · If you are allergic to your pets, change the air filter in your furnace every month. Use high-efficiency filters. · If you are allergic to pet dander, keep pets outside or out of your bedroom. Old carpet and cloth furniture can hold a lot of animal dander. You may need to replace them. When should you call for help? Give an epinephrine shot if:  · You think you are having a severe allergic reaction. · You have symptoms in more than one body area, such as mild nausea and an itchy mouth. After giving an epinephrine shot call 911, even if you feel better. Call 911 if:  · You have symptoms of a severe allergic reaction. These may include:  ¨ Sudden raised, red areas (hives) all over your body.   ¨ Swelling of the throat, mouth, lips, or tongue. ¨ Trouble breathing. ¨ Passing out (losing consciousness). Or you may feel very lightheaded or suddenly feel weak, confused, or restless. · You have been given an epinephrine shot, even if you feel better. Call your doctor now or seek immediate medical care if:  · You have symptoms of an allergic reaction, such as:  ¨ A rash or hives (raised, red areas on the skin). ¨ Itching. ¨ Swelling. ¨ Belly pain, nausea, or vomiting. Watch closely for changes in your health, and be sure to contact your doctor if:  · You do not get better as expected. Where can you learn more? Go to http://mayo-wesley.info/. Enter D897 in the search box to learn more about \"Allergies: Care Instructions. \"  Current as of: February 12, 2016  Content Version: 11.2  © 5897-2129 [employer]. Care instructions adapted under license by [employer] (which disclaims liability or warranty for this information). If you have questions about a medical condition or this instruction, always ask your healthcare professional. [place] [address] any warranty or liability for your use of this information. Upper Respiratory Infection (Cold): Care Instructions  Your Care Instructions    An upper respiratory infection, or URI, is an infection of the nose, sinuses, or throat. URIs are spread by coughs, sneezes, and direct contact. The common cold is the most frequent kind of URI. The flu and sinus infections are other kinds of URIs. Almost all URIs are caused by viruses. Antibiotics won't cure them. But you can treat most infections with home care. This may include drinking lots of fluids and taking over-the-counter pain medicine. You will probably feel better in 4 to 10 days. The doctor has checked you carefully, but problems can develop later. If you notice any problems or new symptoms, get medical treatment right away.   Follow-up care is a key part of your treatment and safety. Be sure to make and go to all appointments, and call your doctor if you are having problems. It's also a good idea to know your test results and keep a list of the medicines you take. How can you care for yourself at home? · To prevent dehydration, drink plenty of fluids, enough so that your urine is light yellow or clear like water. Choose water and other caffeine-free clear liquids until you feel better. If you have kidney, heart, or liver disease and have to limit fluids, talk with your doctor before you increase the amount of fluids you drink. · Take an over-the-counter pain medicine, such as acetaminophen (Tylenol), ibuprofen (Advil, Motrin), or naproxen (Aleve). Read and follow all instructions on the label. · Before you use cough and cold medicines, check the label. These medicines may not be safe for young children or for people with certain health problems. · Be careful when taking over-the-counter cold or flu medicines and Tylenol at the same time. Many of these medicines have acetaminophen, which is Tylenol. Read the labels to make sure that you are not taking more than the recommended dose. Too much acetaminophen (Tylenol) can be harmful. · Get plenty of rest.  · Do not smoke or allow others to smoke around you. If you need help quitting, talk to your doctor about stop-smoking programs and medicines. These can increase your chances of quitting for good. When should you call for help? Call 911 anytime you think you may need emergency care. For example, call if:  · You have severe trouble breathing. Call your doctor now or seek immediate medical care if:  · You seem to be getting much sicker. · You have new or worse trouble breathing. · You have a new or higher fever. · You have a new rash. Watch closely for changes in your health, and be sure to contact your doctor if:  · You have a new symptom, such as a sore throat, an earache, or sinus pain.   · You cough more deeply or more often, especially if you notice more mucus or a change in the color of your mucus. · You do not get better as expected. Where can you learn more? Go to http://mayo-wesley.info/. Enter C650 in the search box to learn more about \"Upper Respiratory Infection (Cold): Care Instructions. \"  Current as of: June 30, 2016  Content Version: 11.2  © 0508-0838 [employer]. Care instructions adapted under license by [employer] (which disclaims liability or warranty for this information). If you have questions about a medical condition or this instruction, always ask your healthcare professional. [address] any warranty or liability for your use of this information.

## 2017-05-22 NOTE — PROGRESS NOTES
HISTORY OF PRESENT ILLNESS  Melvi Chester is a 80 y.o. female. Cough   The history is provided by the patient (Patient reports about one week ago have been having cough with phlem. Reports SOB and wheezing sometimes before or after cough. Reports looking at a funny show laughed  coughing, allergies to mold. Reports SOB with activity especially walking and talking. ). This is a chronic problem. The current episode started more than 1 week ago. The problem occurs daily. The problem has not changed since onset. Associated symptoms include shortness of breath. Pertinent negatives include no chest pain, no abdominal pain and no headaches. The symptoms are aggravated by coughing and walking (mold, pollen and laughing). The symptoms are relieved by medications (cough medication which helps). Treatments tried: humidifier helps but realize filter had mold which could have triggered coughing spell. Shortness of Breath   Associated symptoms include cough, sputum production and wheezing. Pertinent negatives include no fever, no headaches, no ear pain, no neck pain, no hemoptysis, no PND, no orthopnea, no chest pain, no vomiting, no abdominal pain, no leg swelling and no claudication. Review of Systems   Constitutional: Negative. Negative for chills, diaphoresis, fever, malaise/fatigue and weight loss. HENT: Negative. Negative for ear discharge, ear pain, hearing loss, nosebleeds and tinnitus. Eyes: Negative. Negative for blurred vision, double vision, photophobia, pain, discharge and redness. Respiratory: Positive for cough, sputum production, shortness of breath and wheezing. Negative for hemoptysis. Reports have coughing spells when laughing and sometimes wheezing when laughing too much. Reports sputum sometimes but feels all related to mold which was found in her humidifier. Denies SOB and wheezing today   Cardiovascular: Negative.   Negative for chest pain, palpitations, orthopnea, claudication, leg swelling and PND. Gastrointestinal: Negative. Negative for abdominal pain, blood in stool, constipation, diarrhea, heartburn, melena, nausea and vomiting. Genitourinary: Negative. Negative for dysuria, flank pain, frequency, hematuria and urgency. Reports history of urin incontinence   Musculoskeletal: Negative. Negative for back pain, falls, joint pain, myalgias and neck pain. Skin: Negative. Negative for itching. Neurological: Negative. Negative for dizziness, tingling, tremors, sensory change, speech change, focal weakness, seizures, loss of consciousness, weakness and headaches. Endo/Heme/Allergies: Positive for environmental allergies. Negative for polydipsia. Does not bruise/bleed easily. Psychiatric/Behavioral: Negative. Negative for depression, hallucinations, memory loss, substance abuse and suicidal ideas. The patient is not nervous/anxious and does not have insomnia. Physical Exam   Constitutional: She is oriented to person, place, and time. She appears well-developed and well-nourished. No distress. HENT:   Head: Normocephalic and atraumatic. Right Ear: External ear normal.   Left Ear: External ear normal.   Mouth/Throat: Oropharynx is clear and moist. No oropharyngeal exudate. Nasal mucosa moist, edenamous and boggy. Eyes: Conjunctivae and EOM are normal. Pupils are equal, round, and reactive to light. Right eye exhibits no discharge. Left eye exhibits no discharge. No scleral icterus. Neck: Normal range of motion. Neck supple. No JVD present. No tracheal deviation present. No thyromegaly present. Cardiovascular: Normal rate, regular rhythm, normal heart sounds and intact distal pulses. Exam reveals no gallop and no friction rub. No murmur heard. Pulmonary/Chest: Effort normal. No stridor. No respiratory distress. She has no wheezes. She has no rales. She exhibits no tenderness.    Patient with chest congestion, deminished air  movement in posterior lower lobe, coughing , yellow sputum. Abdominal: Soft. Bowel sounds are normal. She exhibits no distension and no mass. There is no tenderness. There is no rebound and no guarding. Genitourinary:   Genitourinary Comments: Not assessed   Musculoskeletal: She exhibits no edema, tenderness or deformity. Lymphadenopathy:     She has cervical adenopathy. Neurological: She is alert and oriented to person, place, and time. She has normal reflexes. She displays normal reflexes. No cranial nerve deficit. She exhibits normal muscle tone. Coordination normal.   Skin: Skin is warm and dry. No rash noted. She is not diaphoretic. No erythema. No pallor. Psychiatric: She has a normal mood and affect. Her behavior is normal. Judgment and thought content normal.       ASSESSMENT and PLAN    ICD-10-CM ICD-9-CM    1. Cough productive of purulent sputum R05 786.2    2.  Upper respiratory tract infection, unspecified type J06.9 465.9    Cxray 2 views to r/o puemonia  Flonase nasal spray 2 sprays each nostril daily x 1 week  Keflex 250 mg po every 6 hours x  10 days

## 2017-05-22 NOTE — PROGRESS NOTES
Patient states she has been feeling SOB and has a cough with some mucus, usually clear but sometimes yellow. Chief Complaint   Patient presents with    Cough    Shortness of Breath     1. Have you been to the ER, urgent care clinic since your last visit? Hospitalized since your last visit? No    2. Have you seen or consulted any other health care providers outside of the [facility] since your last visit? Include any pap smears or colon screening.  No     Health Maintenance Due   Topic Date Due    ZOSTER VACCINE AGE 60>  10/14/1989    GLAUCOMA SCREENING Q2Y  10/14/1994    OSTEOPOROSIS SCREENING (DEXA)  10/14/1994    Pneumococcal 65+ Low/Medium Risk (1 of 2 - PCV13) 10/14/1994    MEDICARE YEARLY EXAM  10/14/1994    DTaP/Tdap/Td series (1 - Tdap) 06/02/2010

## 2017-05-22 NOTE — MR AVS SNAPSHOT
Visit Information Date & Time Provider Department Dept. Phone Encounter #  
 5/22/2017 11:30 AM Genetta Krabbe, NP [address] [phone] [id number] Follow-up Instructions Return if symptoms worsen or fail to improve. Follow-up and Disposition History Upcoming Health Maintenance Date Due ZOSTER VACCINE AGE 60> 10/14/1989 GLAUCOMA SCREENING Q2Y 10/14/1994 OSTEOPOROSIS SCREENING (DEXA) 10/14/1994 Pneumococcal 65+ Low/Medium Risk (1 of 2 - PCV13) 10/14/1994 MEDICARE YEARLY EXAM 10/14/1994 DTaP/Tdap/Td series (1 - Tdap) 6/2/2010 INFLUENZA AGE 9 TO ADULT 8/1/2017 Allergies as of 5/22/2017  Review Complete On: 5/22/2017 By: Genetta Krabbe, NP Severity Noted Reaction Type Reactions Codeine  10/05/2011   Side Effect Nausea and Vomiting Current Immunizations  Never Reviewed Name Date  
 TD Vaccine 6/1/2010 Not reviewed this visit You Were Diagnosed With   
  
 Codes Comments Cough productive of purulent sputum    -  Primary ICD-10-CM: R05 ICD-9-CM: 529. 2 Upper respiratory tract infection, unspecified type     ICD-10-CM: J06.9 ICD-9-CM: 465.9 Vitals BP Pulse Temp Resp Height(growth percentile) Weight(growth percentile) 104/59 88 98.6 °F (37 °C) (Oral) 18 5' (1.524 m) 172 lb 12.8 oz (78.4 kg) SpO2 BMI OB Status Smoking Status 97% 33.75 kg/m2 Postmenopausal Never Smoker BMI and BSA Data Body Mass Index Body Surface Area 33.75 kg/m 2 1.82 m 2 Preferred Pharmacy Pharmacy Name Phone [facility] [phone], [address] AT [place] [address] [phone] Your Updated Medication List  
  
   
This list is accurate as of: 5/22/17 11:59 PM.  Always use your most recent med list.  
  
  
  
  
 ALLERCLEAR 10 mg tablet Generic drug:  loratadine Take 10 mg by mouth daily. ATIVAN 0.5 mg tablet Generic drug:  LORazepam  
Take  by mouth. cephALEXin 250 mg capsule Commonly known as:  Gala Golds Take 1 Cap by mouth every six (6) hours for 10 days. Indications: Upper respiratory infection ELIQUIS 5 mg tablet Generic drug:  apixaban Take 5 mg by mouth two (2) times a day. fluticasone 50 mcg/actuation nasal spray Commonly known as:  FLONASE  
2 spray to each nostrils daily, start 2 sprays to each nostril day one x 1 week  Indications: ALLERGIC RHINITIS  
  
 OTHER Sleep aid OTC, unsure of name. oxyCODONE IR 5 mg immediate release tablet Commonly known as:  Conrad Bhatti Take 1-2 Tabs by mouth every three (3) hours as needed. Max Daily Amount: 80 mg.  
  
 * triamcinolone acetonide 0.1 % topical cream  
Commonly known as:  KENALOG Apply  to affected area two (2) times a day. use thin layer * NASACORT AQ 55 mcg nasal inhaler Generic drug:  triamcinolone 2 Sprays daily. valsartan 40 mg tablet Commonly known as:  DIOVAN Take 40 mg by mouth daily. * Notice: This list has 2 medication(s) that are the same as other medications prescribed for you. Read the directions carefully, and ask your doctor or other care provider to review them with you. Prescriptions Sent to Pharmacy Refills  
 fluticasone (FLONASE) 50 mcg/actuation nasal spray 0 Si spray to each nostrils daily, start 2 sprays to each nostril day one x 1 week  Indications: ALLERGIC RHINITIS Class: Normal  
 Pharmacy: [facility] [address] [place] [address], [address] [place]  [facility] Ph #: [phone]  
 cephALEXin (KEFLEX) 250 mg capsule 0 Sig: Take 1 Cap by mouth every six (6) hours for 10 days. Indications: Upper respiratory infection Class: Normal  
 Pharmacy: [facility] [address] [place] [address] [address] [facility] Ph #: [phone] Route: Oral  
  
Follow-up Instructions Return if symptoms worsen or fail to improve. To-Do List   
 2017 Imaging:  XR CHEST PA LAT Patient Instructions Allergies: Care Instructions Your Care Instructions Allergies occur when your body's defense system (immune system) overreacts to certain substances. The immune system treats a harmless substance as if it were a harmful germ or virus. Many things can cause this overreaction, including pollens, medicine, food, dust, animal dander, and mold. Allergies can be mild or severe. Mild allergies can be managed with home treatment. But medicine may be needed to prevent problems. Managing your allergies is an important part of staying healthy. Your doctor may suggest that you have allergy testing to help find out what is causing your allergies. When you know what things trigger your symptoms, you can avoid them. This can prevent allergy symptoms and other health problems. For severe allergies that cause reactions that affect your whole body (anaphylactic reactions), your doctor may prescribe a shot of epinephrine to carry with you in case you have a severe reaction. Learn how to give yourself the shot and keep it with you at all times. Make sure it is not . Follow-up care is a key part of your treatment and safety. Be sure to make and go to all appointments, and call your doctor if you are having problems. It's also a good idea to know your test results and keep a list of the medicines you take. How can you care for yourself at home? · If you have been told by your doctor that dust or dust mites are causing your allergy, decrease the dust around your bed: 
[facility] sheets, pillowcases, and other bedding in hot water every week. ¨ Use dust-proof covers for pillows, duvets, and mattresses. Avoid plastic covers because they tear easily and do not \"breathe. \" Wash as instructed on the label. ¨ Do not use any blankets and pillows that you do not need. ¨ Use blankets that you can wash in your washing machine. ¨ Consider removing drapes and carpets, which attract and hold dust, from your bedroom. · If you are allergic to house dust and mites, do not use home humidifiers. Your doctor can suggest ways you can control dust and mites. · Look for signs of cockroaches. Cockroaches cause allergic reactions. Use cockroach baits to get rid of them. Then, clean your home well. Cockroaches like areas where grocery bags, newspapers, empty bottles, or cardboard boxes are stored. Do not keep these inside your home, and keep trash and food containers sealed. Seal off any spots where cockroaches might enter your home. · If you are allergic to mold, get rid of furniture, rugs, and drapes that smell musty. Check for mold in the bathroom. · If you are allergic to outdoor pollen or mold spores, use air-conditioning. Change or clean all filters every month. Keep windows closed. · If you are allergic to pollen, stay inside when pollen counts are high. Use a vacuum  with a HEPA filter or a double-thickness filter at least two times each week. · Stay inside when air pollution is bad. Avoid paint fumes, perfumes, and other strong odors. · Avoid conditions that make your allergies worse. Stay away from smoke. Do not smoke or let anyone else smoke in your house. Do not use fireplaces or wood-burning stoves. · If you are allergic to your pets, change the air filter in your furnace every month. Use high-efficiency filters. · If you are allergic to pet dander, keep pets outside or out of your bedroom. Old carpet and cloth furniture can hold a lot of animal dander. You may need to replace them. When should you call for help? Give an epinephrine shot if: 
· You think you are having a severe allergic reaction. · You have symptoms in more than one body area, such as mild nausea and an itchy mouth. After giving an epinephrine shot call 911, even if you feel better. Call 911 if: 
· You have symptoms of a severe allergic reaction. These may include: 
¨ Sudden raised, red areas (hives) all over your body. ¨ Swelling of the throat, mouth, lips, or tongue. ¨ Trouble breathing. ¨ Passing out (losing consciousness). Or you may feel very lightheaded or suddenly feel weak, confused, or restless. · You have been given an epinephrine shot, even if you feel better. Call your doctor now or seek immediate medical care if: 
· You have symptoms of an allergic reaction, such as: ¨ A rash or hives (raised, red areas on the skin). ¨ Itching. ¨ Swelling. ¨ Belly pain, nausea, or vomiting. Watch closely for changes in your health, and be sure to contact your doctor if: 
· You do not get better as expected. Where can you learn more? Go to http://mayoDaily Interactive Networkswesley.info/. Enter S675 in the search box to learn more about \"Allergies: Care Instructions. \" Current as of: February 12, 2016 Content Version: 11.2 © 4594-5449 Fanzila. Care instructions adapted under license by SkillWiz (which disclaims liability or warranty for this information). If you have questions about a medical condition or this instruction, always ask your healthcare professional. [address] any warranty or liability for your use of this information. Upper Respiratory Infection (Cold): Care Instructions Your Care Instructions An upper respiratory infection, or URI, is an infection of the nose, sinuses, or throat. URIs are spread by coughs, sneezes, and direct contact. The common cold is the most frequent kind of URI. The flu and sinus infections are other kinds of URIs. Almost all URIs are caused by viruses. Antibiotics won't cure them. But you can treat most infections with home care.  This may include drinking lots of fluids and taking over-the-counter pain medicine. You will probably feel better in 4 to 10 days. The doctor has checked you carefully, but problems can develop later. If you notice any problems or new symptoms, get medical treatment right away. Follow-up care is a key part of your treatment and safety. Be sure to make and go to all appointments, and call your doctor if you are having problems. It's also a good idea to know your test results and keep a list of the medicines you take. How can you care for yourself at home? · To prevent dehydration, drink plenty of fluids, enough so that your urine is light yellow or clear like water. Choose water and other caffeine-free clear liquids until you feel better. If you have kidney, heart, or liver disease and have to limit fluids, talk with your doctor before you increase the amount of fluids you drink. · Take an over-the-counter pain medicine, such as acetaminophen (Tylenol), ibuprofen (Advil, Motrin), or naproxen (Aleve). Read and follow all instructions on the label. · Before you use cough and cold medicines, check the label. These medicines may not be safe for young children or for people with certain health problems. · Be careful when taking over-the-counter cold or flu medicines and Tylenol at the same time. Many of these medicines have acetaminophen, which is Tylenol. Read the labels to make sure that you are not taking more than the recommended dose. Too much acetaminophen (Tylenol) can be harmful. · Get plenty of rest. 
· Do not smoke or allow others to smoke around you. If you need help quitting, talk to your doctor about stop-smoking programs and medicines. These can increase your chances of quitting for good. When should you call for help? Call 911 anytime you think you may need emergency care. For example, call if: 
· You have severe trouble breathing. Call your doctor now or seek immediate medical care if: · You seem to be getting much sicker. · You have new or worse trouble breathing. · You have a new or higher fever. · You have a new rash. Watch closely for changes in your health, and be sure to contact your doctor if: 
· You have a new symptom, such as a sore throat, an earache, or sinus pain. · You cough more deeply or more often, especially if you notice more mucus or a change in the color of your mucus. · You do not get better as expected. Where can you learn more? Go to http://mayo-wesley.info/. Enter R893 in the search box to learn more about \"Upper Respiratory Infection (Cold): Care Instructions. \" Current as of: June 30, 2016 Content Version: 11.2 © 5518-5094 Geoloqi. Care instructions adapted under license by Tricycle (which disclaims liability or warranty for this information). If you have questions about a medical condition or this instruction, always ask your healthcare professional. [place] [address] any warranty or liability for your use of this information. Introducing [facility]! Myesha Witt introduces [email or web address] patient portal. Now you can access parts of your medical record, email your doctor's office, and request medication refills online. 1. In your internet browser, go to [email or web address] 2. Click on the First Time User? Click Here link in the Sign In box. You will see the New Member Sign Up page. 3. Enter your [email or web address] Access Code exactly as it appears below. You will not need to use this code after youve completed the sign-up process. If you do not sign up before the expiration date, you must request a new code. · [email or web address] Access Code: [email or web address] Expires: 6/13/2017  2:27 PM 
 
4. Enter the last four digits of your Social Security Number (xxxx) and Date of Birth (mm/dd/yyyy) as indicated and click Submit. You will be taken to the next sign-up page. 5. Create a CellControl ID. This will be your CellControl login ID and cannot be changed, so think of one that is secure and easy to remember. 6. Create a CellControl password. You can change your password at any time. 7. Enter your Password Reset Question and Answer. This can be used at a later time if you forget your password. 8. Enter your e-mail address. You will receive e-mail notification when new information is available in [address] 9. Click Sign Up. You can now view and download portions of your medical record. 10. Click the Download Summary menu link to download a portable copy of your medical information. If you have questions, please visit the Frequently Asked Questions section of the CellControl website. Remember, CellControl is NOT to be used for urgent needs. For medical emergencies, dial 911. Now available from your iPhone and Android! Please provide this summary of care documentation to your next provider. Your primary care clinician is listed as Nimesh Hughes. If you have any questions after today's visit, please call [phone].

## 2017-07-27 NOTE — PERIOP NOTES
Anesthesia reports 320mg Propofol, 40mg Lidocaine and 700mL NS given during procedure. Received report from anesthesia staff on vital signs and status of patient.

## 2017-07-27 NOTE — PROCEDURES
NAME:  Woo Scott   :   10/14/1929   MRN:   [id number]     Date/Time:  2017 12:12 PM    Esophagogastroduodenoscopy (EGD) Procedure Note    Procedure: Esophagogastroduodenoscopy with biopsy    Indication:  Iron deficiency anemia  Pre-operative Diagnosis: see indication above  Post-operative Diagnosis: see findings below  :  Jayson Arita MD  Referring Provider:   -Hanh Lawrence MD    Exam:  Airway: clear, no airway problems anticipated  Heart: RRR, without gallops or rubs  Lungs: clear bilaterally without wheezes, crackles, or rhonchi  Abdomen: soft, nontender, nondistended, bowel sounds present  Mental Status: awake, alert and oriented to person, place and time     Anethesia/Sedation:  MAC anesthesia Propofol 150mg IV  Procedure Details   After informed consent was obtained for the procedure, with all risks and benefits of procedure explained the patient was taken to the endoscopy suite and placed in the left lateral decubitus position. Following sequential administration of sedation as per above, the [id number] gastroscope was inserted into the mouth and advanced under direct vision to third portion of the duodenum. A careful inspection was made as the gastroscope was withdrawn, including a retroflexed view of the proximal stomach; findings and interventions are described below. Findings:    -Normal esophagus  -Normal gastric mucosa; biopsied to exclude gastritis  -Normal duodenal mucosa; biopsied to exclude celiac disease    Therapies:  biopsy of stomach ; biopsy of duodenal   Specimens: #1 duodenum; #2 gastric  EBL:  None. Complications:   None; patient tolerated the procedure well. Impression:    -Normal esophagus  -Normal gastric mucosa; biopsied to exclude gastritis  -Normal duodenal mucosa; biopsied to exclude celiac disease    Recommendations:  -Await pathology. , -Proceed to colonoscopy    Discharge disposition:  Home in the company of  when able to ambulate after colonoscopy    Jenniffer Madison MD

## 2017-07-27 NOTE — ANESTHESIA POSTPROCEDURE EVALUATION
Post-Anesthesia Evaluation and Assessment    Patient: Gucci Jamil MRN: [id number]  SSN: [id number]    YOB: 1929  Age: 80 y.o. Sex: female       Cardiovascular Function/Vital Signs  Visit Vitals    /86    Pulse 71    Temp 36.6 °C (97.8 °F)    Resp 9    Ht 5' (1.524 m)    Wt 77.7 kg (171 lb 6 oz)    SpO2 99%    Breastfeeding No    BMI 33.47 kg/m2       Patient is status post MAC anesthesia for Procedure(s):  ESOPHAGOGASTRODUODENOSCOPY (EGD)  COLONOSCOPY  ESOPHAGOGASTRODUODENAL (EGD) BIOPSY  COLON BIOPSY. Nausea/Vomiting: None    Postoperative hydration reviewed and adequate. Pain:  Pain Scale 1: Numeric (0 - 10) (07/27/17 1236)  Pain Intensity 1: 0 (07/27/17 1236)   Managed    Neurological Status: At baseline    Mental Status and Level of Consciousness: Arousable    Pulmonary Status:   O2 Device: Room air (07/27/17 1236)   Adequate oxygenation and airway patent    Complications related to anesthesia: None    Post-anesthesia assessment completed.  No concerns    Signed By: Sara Moss MD     July 27, 2017

## 2017-07-27 NOTE — H&P
See office History and Physical Note. The patient was reexamined. No interval change in the last 30 days. Proceed with procedure(s) with deep sedation or conscious sedation as clinically indicated.

## 2017-07-27 NOTE — PERIOP NOTES
Endoscope was pre-cleaned at the bedside immediately following procedure by [facility] [place].

## 2017-07-27 NOTE — ROUTINE PROCESS
Eugenie Kelley  10/14/1929  [id number]    Situation:  Verbal report received from: Butch Mckinley  Procedure: Procedure(s):  ESOPHAGOGASTRODUODENOSCOPY (EGD)  COLONOSCOPY  ESOPHAGOGASTRODUODENAL (EGD) BIOPSY  COLON BIOPSY    Background:    Preoperative diagnosis: IRON DEF ANEMIA  Postoperative diagnosis: Iron deficiency enemia, diverticulosis, cecal mass    :  Dr. Thi De  Assistant(s): Endoscopy Technician-1: Best Yeh  Endoscopy RN-1: Darrel Vega RN    Specimens:   ID Type Source Tests Collected by Time Destination   1 : BX Preservative Duodenum  Sidney Canseco MD 7/27/2017 1153 Pathology   2 : BX Preservative Gastric  Sidney Canseco MD 7/27/2017 1153 Pathology   3 : BX - cecal mass Preservative Cecum  Sidney Canseco MD 7/27/2017 1157 Pathology     H. Pylori  no    Assessment:  Intra-procedure medications       Anesthesia gave intra-procedure sedation and medications, see anesthesia flow sheet yes    Intravenous fluids: NS@ KVO     Vital signs stable       Abdominal assessment: round and soft       Recommendation:  Discharge patient per MD order  .     Family or Friend  Suzi Hernández  Permission to share finding with family or friend yes

## 2017-07-27 NOTE — DISCHARGE INSTRUCTIONS
Meghana Beck  [id number]  10/14/1929    EGD/COLON DISCHARGE INSTRUCTIONS  Discomfort:  Redness at IV site- apply warm compress to area; if redness or soreness persist- contact your physician  There may be a slight amount of blood passed from the rectum  Gaseous discomfort- walking, belching will help relieve any discomfort  You may not operate a vehicle for 12 hours  You may not engage in an occupation involving machinery or appliances for rest of today  You may not drink alcoholic beverages for at least 12 hours  Avoid making any critical decisions for at least 24 hour  DIET:   Regular diet. - however -  remember your colon is empty and a heavy meal will produce gas. Avoid these foods:  vegetables, fried / greasy foods, carbonated drinks for today  MEDICATION:         ACTIVITY:  You may not resume your normal daily activities until tomorrow AM; it is recommended that you spend the remainder of the day resting -  avoid any strenuous activity. CALL M.D. 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 Tara Norwood for the results of procedure / biopsy in 7-10 days  Telephone # [phone]  My office will coordinate further testing as CT scan and appropriate referral to surgeon or oncologist dependent on findings  Do not take Tramaine Conway MD  [employer] Activation    Thank you for requesting access to [employer]. Please follow the instructions below to securely access and download your online medical record. RMDMgroup allows you to send messages to your doctor, view your test results, renew your prescriptions, schedule appointments, and more. How Do I Sign Up? 1. In your internet browser, go to [email or web address]  2. Click on the First Time User? Click Here link in the Sign In box. You will be redirect to the New Member Sign Up page. 3. Enter your R[employer] Access Code exactly as it appears below. You will not need to use this code after youve completed the sign-up process. If you do not sign up before the expiration date, you must request a new code. RMDM[employer] Access Code: [email or web address]  Expires: 10/25/2017 10:16 AM (This is the date your RMDMgroup access code will )    4. Enter the last four digits of your Social Security Number (xxxx) and Date of Birth (mm/dd/yyyy) as indicated and click Submit. You will be taken to the next sign-up page. 5. Create a RMDMgroup ID. This will be your RMDMgroup login ID and cannot be changed, so think of one that is secure and easy to remember. 6. Create a RMDMgroup password. You can change your password at any time. 7. Enter your Password Reset Question and Answer. This can be used at a later time if you forget your password. 8. Enter your e-mail address. You will receive e-mail notification when new information is available in [address] 9. Click Sign Up. You can now view and download portions of your medical record. 10. Click the Download Summary menu link to download a portable copy of your medical information. Additional Information    If you have questions, please visit the Frequently Asked Questions section of the RMDMgroup website at [email or web address] Remember, RMDMgroup is NOT to be used for urgent needs. For medical emergencies, dial 911.

## 2017-07-27 NOTE — PROCEDURES
NAME:  Mariella Streeter   :   10/14/1929   MRN:   [id number]     Date/Time:  2017 12:15 PM    Colonoscopy Operative Report    Procedure Type:   Colonoscopy with biopsy     Indications:     Iron deficiency anemia  Pre-operative Diagnosis: see indication above  Post-operative Diagnosis:  See findings below  :  Bipin Lloyd MD  Referring Provider: -Lily Rodriguez MD    Exam:  Airway: clear, no airway problems anticipated  Heart: RRR, without gallops or rubs  Lungs: clear bilaterally without wheezes, crackles, or rhonchi  Abdomen: soft, nontender, nondistended, bowel sounds present  Mental Status: awake, alert and oriented to person, place and time    Sedation:  MAC anesthesia Propofol 170mg IV (in addition to EGD dose)  Procedure Details:  After informed consent was obtained with all risks and benefits of procedure explained and preoperative exam completed, the patient was taken to the endoscopy suite and placed in the left lateral decubitus position. Upon sequential sedation as per above, a digital rectal exam was performed demonstrating internal hemorrhoids. The Olympus videocolonoscope  was inserted in the rectum and carefully advanced to the cecum, which was identified by the ileocecal valve and appendiceal orifice. The quality of preparation was adequate. The colonoscope was slowly withdrawn with careful evaluation between folds. Retroflexion in the rectum was completed demonstrating internal hemorrhoids. Findings:     -Large friable ulcerated cecal mass nearly filling the entirety of the cecum, extending up to the ileocecal valve. The appearance of this lesion is most consistent with a malignancy; The lesion is biopsied  -Sigmoid diverticulosis  -Small internal hemorrhoids    Specimen Removed:  #3 cecal mass  Complications: None. EBL:  None. Impression:    -Large friable ulcerated cecal mass nearly filling the entirety of the cecum, extending up to the ileocecal valve.   The appearance of this lesion is most consistent with a malignancy; The lesion is biopsied  -Sigmoid diverticulosis  -Small internal hemorrhoids    Recommendations: --Await pathology. , -My office will coordinate further testing including CT scan and referral to surgeon or oncologist as appropriate. Regular diet. Resume normal medication(s). You will receive a letter about the biopsy results in about 7-10 days. You may be asked to call your doctor's office for the results. Discharge Disposition:  Home in the company of a  when able to ambulate.       Anne Galindo MD

## 2017-07-27 NOTE — ANESTHESIA PREPROCEDURE EVALUATION
Anesthetic History   No history of anesthetic complications            Review of Systems / Medical History  Patient summary reviewed, nursing notes reviewed and pertinent labs reviewed    Pulmonary  Within defined limits                 Neuro/Psych         TIA (2009) and psychiatric history     Cardiovascular    Hypertension              Exercise tolerance: >4 METS     GI/Hepatic/Renal  Within defined limits              Endo/Other        Obesity and arthritis     Other Findings   Comments: Spinal stenosis  Hx of cervical fracture not operated on    DVT-PE - IVC filter         Physical Exam    Airway  Mallampati: II  TM Distance: 4 - 6 cm  Neck ROM: decreased range of motion   Mouth opening: Normal     Cardiovascular  Regular rate and rhythm,  S1 and S2 normal,  no murmur, click, rub, or gallop             Dental    Dentition: Poor dentition  Comments: Multiple , slightly chipped front top and bottom teeth   Pulmonary  Breath sounds clear to auscultation               Abdominal  GI exam deferred       Other Findings            Anesthetic Plan    ASA: 3  Anesthesia type: MAC                ASA III due to TIA and advanced age

## 2017-07-27 NOTE — IP AVS SNAPSHOT
[address] [place] [facility]. 
[phone] Patient: Anastasia Palmer 
MRN: [id number] :10/14/1929 You are allergic to the following Allergen Reactions Codeine Nausea and Vomiting Recent Documentation Height Weight Breastfeeding? BMI OB Status Smoking Status 1.524 m 77.7 kg No 33.47 kg/m2 Postmenopausal Never Smoker Emergency Contacts Name Discharge Info Relation Home Work Mobile Vasile Griggs DISCHARGE CAREGIVER [3] Daughter [21] [phone] About your hospitalization You were admitted on:  2017 You last received care in the:  [facility] ENDOSCOPY You were discharged on:  2017 Unit phone number:  [phone] Why you were hospitalized Your primary diagnosis was:  Not on File Providers Seen During Your Hospitalizations Provider Role Specialty Primary office phone Kim Perez MD Attending Provider Gastroenterology [phone] Your Primary Care Physician (PCP) Primary Care Physician Office Phone Office Fax Fritz Shaw [phone] Follow-up Information None Current Discharge Medication List  
  
CONTINUE these medications which have NOT CHANGED Dose & Instructions Dispensing Information Comments Morning Noon Evening Bedtime ALLERCLEAR 10 mg tablet Generic drug:  loratadine Your last dose was: Your next dose is:    
   
   
 Dose:  10 mg Take 10 mg by mouth daily. Refills:  0  
     
   
   
   
  
 ATIVAN 0.5 mg tablet Generic drug:  LORazepam  
   
Your last dose was: Your next dose is: Take  by mouth. Refills:  0  
     
   
   
   
  
 ELIQUIS 5 mg tablet Generic drug:  apixaban Your last dose was: Your next dose is:    
   
   
 Dose:  5 mg Take 5 mg by mouth two (2) times a day. Refills:  0 fluticasone 50 mcg/actuation nasal spray Commonly known as:  Jennifer Story Your last dose was: Your next dose is:    
   
   
 2 spray to each nostrils daily, start 2 sprays to each nostril day one x 1 week  Indications: ALLERGIC RHINITIS Quantity:  1 Bottle Refills:  0  
     
   
   
   
  
 OTHER Your last dose was: Your next dose is:    
   
   
 Sleep aid OTC, unsure of name. Refills:  0  
     
   
   
   
  
 oxyCODONE IR 5 mg immediate release tablet Commonly known as:  Matthew Light Your last dose was: Your next dose is:    
   
   
 Dose:  5-10 mg Take 1-2 Tabs by mouth every three (3) hours as needed. Max Daily Amount: 80 mg.  
 Quantity:  80 Tab Refills:  0  
     
   
   
   
  
 * triamcinolone acetonide 0.1 % topical cream  
Commonly known as:  KENALOG Your last dose was: Your next dose is:    
   
   
 Apply  to affected area two (2) times a day. use thin layer Refills:  0  
     
   
   
   
  
 * NASACORT AQ 55 mcg nasal inhaler Generic drug:  triamcinolone Your last dose was: Your next dose is:    
   
   
 Dose:  2 Spray 2 Sprays daily. Refills:  0  
     
   
   
   
  
 valsartan 40 mg tablet Commonly known as:  DIOVAN Your last dose was: Your next dose is:    
   
   
 Dose:  40 mg Take 40 mg by mouth daily. Refills:  0  
     
   
   
   
  
 * Notice: This list has 2 medication(s) that are the same as other medications prescribed for you. Read the directions carefully, and ask your doctor or other care provider to review them with you. Discharge Instructions [place] 
[id number] 
10/14/1929 EGD/COLON DISCHARGE INSTRUCTIONS Discomfort: 
Redness at IV site- apply warm compress to area; if redness or soreness persist- contact your physician There may be a slight amount of blood passed from the rectum Gaseous discomfort- walking, belching will help relieve any discomfort You may not operate a vehicle for 12 hours You may not engage in an occupation involving machinery or appliances for rest of today You may not drink alcoholic beverages for at least 12 hours Avoid making any critical decisions for at least 24 hour DIET: 
 Regular diet.  however -  remember your colon is empty and a heavy meal will produce gas. Avoid these foods:  vegetables, fried / greasy foods, carbonated drinks for today MEDICATION: 
 
 
  
ACTIVITY: 
You may not resume your normal daily activities until tomorrow AM; it is recommended that you spend the remainder of the day resting -  avoid any strenuous activity. CALL M.D. ANY SIGN OF: Increasing pain, nausea, vomiting Abdominal distension (swelling) New increased bleeding (oral or rectal) Fever (chills) Pain in chest area Bloody discharge from nose or mouth Shortness of breath IMPRESSION: 
-Normal esophagus 
-Normal gastric mucosa; biopsied to exclude gastritis 
-Normal duodenal mucosa; biopsied to exclude celiac disease 
-Large friable ulcerated cecal mass nearly filling the entirety of the cecum, extending up to the ileocecal valve. The appearance of this lesion is most consistent with a malignancy; The lesion is biopsied 
-Sigmoid diverticulosis 
-Small internal hemorrhoids Follow-up Instructions: 
 Call Dr. Mireya Mata for the results of procedure / biopsy in 7-10 days Telephone # [phone] My office will coordinate further testing as CT scan and appropriate referral to surgeon or oncologist dependent on findings Do not take Prakash Barajas MD 
PredPol Activation Thank you for requesting access to PredPol. Please follow the instructions below to securely access and download your online medical record. PredPol allows you to send messages to your doctor, view your test results, renew your prescriptions, schedule appointments, and more. How Do I Sign Up? 1. In your internet browser, go to [email or web address] 
2. Click on the First Time User? Click Here link in the Sign In box. You will be redirect to the New Member Sign Up page. 3. Enter your Appetas Access Code exactly as it appears below. You will not need to use this code after youve completed the sign-up process. If you do not sign up before the expiration date, you must request a new code. Appetas Access Code: [email or web address] Expires: 10/25/2017 10:16 AM (This is the date your Appetas access code will ) 4. Enter the last four digits of your Social Security Number (xxxx) and Date of Birth (mm/dd/yyyy) as indicated and click Submit. You will be taken to the next sign-up page. 5. Create a Appetas ID. This will be your Appetas login ID and cannot be changed, so think of one that is secure and easy to remember. 6. Create a Appetas password. You can change your password at any time. 7. Enter your Password Reset Question and Answer. This can be used at a later time if you forget your password. 8. Enter your e-mail address. You will receive e-mail notification when new information is available in [address] 9. Click Sign Up. You can now view and download portions of your medical record. 10. Click the Download Summary menu link to download a portable copy of your medical information. Additional Information If you have questions, please visit the Frequently Asked Questions section of the Appetas website at [email or web address] Remember, Appetas is NOT to be used for urgent needs. For medical emergencies, dial 911. Discharge Orders None Introducing \[facility] [place] [facility]\"" HEALTH SERVICES! [employer] introduces Appetas patient portal. Now you can access parts of your medical record, email your doctor's office, and request medication refills online. 1. In your internet browser, go to [email or web address] 2. Click on the First Time User? Click Here link in the Sign In box. You will see the New Member Sign Up page. 3. Enter your Pint Please Access Code exactly as it appears below. You will not need to use this code after youve completed the sign-up process. If you do not sign up before the expiration date, you must request a new code. · Pint Please Access Code: [email or web address] Expires: 10/25/2017 10:16 AM 
 
4. Enter the last four digits of your Social Security Number (xxxx) and Date of Birth (mm/dd/yyyy) as indicated and click Submit. You will be taken to the next sign-up page. 5. Create a Pint Please ID. This will be your Pint Please login ID and cannot be changed, so think of one that is secure and easy to remember. 6. Create a Pint Please password. You can change your password at any time. 7. Enter your Password Reset Question and Answer. This can be used at a later time if you forget your password. 8. Enter your e-mail address. You will receive e-mail notification when new information is available in [address] 9. Click Sign Up. You can now view and download portions of your medical record. 10. Click the Download Summary menu link to download a portable copy of your medical information. If you have questions, please visit the Frequently Asked Questions section of the Pint Please website. Remember, Pint Please is NOT to be used for urgent needs. For medical emergencies, dial 911. Now available from your iPhone and Android! General Information Please provide this summary of care documentation to your next provider. Patient Signature:  ____________________________________________________________ Date:  ____________________________________________________________  
  
Boris Lagos Provider Signature:  ____________________________________________________________ Date:  ____________________________________________________________

## 2017-08-11 PROBLEM — C18.2 MALIGNANT NEOPLASM OF ASCENDING COLON (HCC): Status: ACTIVE | Noted: 2017-01-01

## 2017-08-11 NOTE — LETTER
8/11/2017 11:53 AM 
 
Ms. [address] [phone] PREPARATION INSTRUCTIONS FOR SURGERY 
 
2 days prior to your surgery, DO NOT EAT ANY SOLID FOOD AFTER MIDNIGHT. 1 day prior to your surgery, start a strict, clear liquid diet. If you put a liquid in a clear glass and you can see through it, it is OK to drink. Clear liquid diet also includes: 
Apple, white grape, cranberry juice Beef or Chicken broths Tea and coffee (NO MILK OR CREAM) Soda, Gatorade, Marco-aid and plain Jello (any color except red and purple)NO FRUIT 
 
AVOID: JUICES WITH PULP, MILK, CREAM AND ALL SOLID FOOD Fill the plastic container containing the laxative powder with water, shake well and put the container in the refrigerator. 12:00 noon - take 2 Bisacodyl tablets with water (if included with your prep). WAIT FOR A BOWEL MOVEMENT After your 1st bowel movement occurs (usually 1-6 hours after taking tablets) Begin to drink the solution. If no bowel movement occurs by 5 - 6 pm, then start drinking solution. DRINK ALL OF THE SOLUTION Drink 1 (8 oz) glass every 10 minutes (about 8 glasses). Drink each glass quickly rather than drinking small amounts continuously. Be sure to drink all of the solution. You should continue to have loose bowel movements for one to two hours after drinking your last glass of solution. Continue to drink liquids until midnight. At midnight, DO NOT EAT OR DRINK ANYTHING. Sincerely, Vero Batista MD

## 2017-08-11 NOTE — PROGRESS NOTES
HISTORY OF PRESENT ILLNESS  Lorna Harman is a 80 y.o. female. HPI Comments:   Appetite ok  BMs ok occ constipation/diarrhea, usually BM 1x/day  Rare GERD symptoms    Underwent anemia w/o with Dr. Silvina Lo    Hgb 8.9  EGD ok  Colonoscopy: Cecal mass: +adenocarcinoma  No prior colonoscopies    PE post knee surgery  Was on eliquis for hx of PE, stopped by Dr. Jacobo Kellogg in May after negative CTA  TIA 2009    Takes daily ibuprofen  Should probably be on daily 81mg asa    CT on Monday, no metastatic Dz, +IVC filter    Has an occasional chronic cough    ____________________________________________________________________________  Patient presents with:  Mass: Pt seen at the request of Dr. Engle Reason to evaluate for colon cancer    /75 (BP 1 Location: Left arm, BP Patient Position: Sitting)  Pulse 85  Temp 97.6 °F (36.4 °C) (Oral)   Resp 20  Ht 5' (1.524 m)  Wt 76.5 kg (168 lb 9.6 oz)  SpO2 99%  BMI 32.93 kg/m2  Past Medical History:  No date: Arthritis  No date: Hypertension  No date: Ill-defined condition      Comment: incontinence  No date: Ill-defined condition      Comment: Fractured Cervical disc , no surgery  No date: Other ill-defined conditions      Comment: spinal stenosis  No date:  Other ill-defined conditions      Comment: elevated cholesterol  No date: Psychiatric disorder      Comment: anxiety  No date: Stroke [facility])      Comment: TIA 2009  Past Surgical History:  7/27/2017: COLONOSCOPY N/A      Comment: COLONOSCOPY performed by Candido Ferrera MD at [facility]               ENDOSCOPY  7/27/2017: COLONOSCOPY,DIAGNOSTIC      Comment:    No date: HX BREAST BIOPSY      Comment: bilateral  No date: HX CATARACT REMOVAL      Comment: bilateral  No date: HX GYN      Comment: cyst removed from vagina  No date: HX HEENT      Comment: [place] tooth extraction  04/2015: HX ORTHOPAEDIC      Comment: Removed cyst from spinal chord  7/27/2017: UPPER GI ENDOSCOPY,BIOPSY      Comment:    Social History    Marital status:  Spouse name:                       Years of education:                 Number of children:               Social History Main Topics    Smoking status: Never Smoker                                                                Smokeless status: Never Used                        Alcohol use: Yes           0.5 oz/week       1 Shots of liquor per week       Comment: 5 drinks per week    Sexual activity: No                     Review of patient's family history indicates: Other                          Mother                    Dementia                       Mother                    Heart Attack                   Mother                    Heart Disease                  Father                    Cancer                         Father                    Stomach Cancer                 Father                    Current Outpatient Prescriptions:  triamcinolone (NASACORT AQ) 55 mcg nasal inhaler, 2 Sprays daily. OTHER, Sleep aid OTC, unsure of name. fluticasone (FLONASE) 50 mcg/actuation nasal spray, 2 spray to each nostrils daily, start 2 sprays to each nostril day one x 1 week  Indications: ALLERGIC RHINITIS  LORazepam (ATIVAN) 0.5 mg tablet, Take  by mouth.  valsartan (DIOVAN) 40 mg tablet, Take 40 mg by mouth daily. loratadine (ALLERCLEAR) 10 mg tablet, Take 10 mg by mouth daily. No current facility-administered medications for this visit. Allergies:  -- Codeine -- Nausea and Vomiting  _____________________________________________________________________________                        Mass   The history is provided by the patient. This is a new problem. The current episode started more than 1 week ago. The problem occurs constantly. The problem has not changed since onset. Pertinent negatives include no chest pain, no abdominal pain, no headaches and no shortness of breath. Nothing aggravates the symptoms. Nothing relieves the symptoms. The treatment provided no relief.        Review of Systems Constitutional: Negative for chills, fever and weight loss. HENT: Negative for ear pain. Eyes: Negative for pain. Respiratory: Negative for shortness of breath. Cardiovascular: Negative for chest pain. Gastrointestinal: Negative for abdominal pain and blood in stool. Genitourinary: Negative for hematuria. Musculoskeletal: Negative for joint pain. Skin: Negative for rash. Neurological: Negative for dizziness, focal weakness, seizures and headaches. Endo/Heme/Allergies: Does not bruise/bleed easily. Psychiatric/Behavioral: The patient does not have insomnia. Physical Exam   Constitutional: She is oriented to person, place, and time. She appears well-developed and well-nourished. No distress. HENT:   Head: Normocephalic and atraumatic. Mouth/Throat: No oropharyngeal exudate. Eyes: Pupils are equal, round, and reactive to light. Neck: Normal range of motion. No tracheal deviation present. Cardiovascular: Normal rate, regular rhythm and normal heart sounds. No murmur heard. Pulmonary/Chest: Effort normal and breath sounds normal. No respiratory distress. She has no wheezes. Abdominal: Soft. Bowel sounds are normal. She exhibits no distension and no mass. There is no tenderness. There is no rebound and no guarding. No hernia. Obese   Musculoskeletal: Normal range of motion. She exhibits no edema or tenderness. Lymphadenopathy:     She has no cervical adenopathy. Neurological: She is alert and oriented to person, place, and time. Skin: Skin is warm. No rash noted. She is not diaphoretic. No erythema. Psychiatric: She has a normal mood and affect. Her behavior is normal.       ASSESSMENT and PLAN    ICD-10-CM ICD-9-CM    1. Malignant neoplasm of ascending colon (HCC) C18.2 153.6      I had an extensive discussion with Jessica Grayson and her friend regarding the risks, benefits, and alternatives of proceeding with a  Laparoscopic right colectomy, robot assisted.   Risks of surgery including the risk of anesthesia, bleeding, infection, injury to bowel, anastomotic leak, anastomotic stricture, incomplete resection, and recurrence were discussed and she is in agreement to proceed. I reviewed with Willam Moreno her increased risk of bleeding secondary to Asprin/ibuprophen use. I have asked her to discontinue the Asprin/ibuprophen for 5 days prior to surgery to reduce this risk. Hx of PE with last surgery. Has filter in. Currently has risk of bleeding. Will use SCDs/early ambulation. Consider Lovenox post op based on intraop findings. The patent will undergo preoperative evaluation and be scheduled at her earliest convenience. Bowel prep instructions were given. All questions were answered. Expressed understanding. Discussed with Dr. Erna Lugo    Thank you for this consult.

## 2017-08-11 NOTE — LETTER
8/11/2017 11:49 AM 
 
Ms. [address] [phone] Surgery Instruction Sheet You have been scheduled for surgery on 8/17/17 at 8:00am at [address]. Please report to the Surgery Center at 6:00am, this is approximately 2 hours prior to your surgery time. The Surgery Center is located on the [address] [place] [address] side of the [facility], just next to the Emergency Room. Reserved parking is available and  parking if lot is full. You will need to have a Pre-op Visit prior to your surgery. Report to the Surgery Center on 8/14/17 at 4;00pm.  Bring a list of medications and your insurance cards with you. You may eat/drink prior to this visit. Call your physician immediately if you notice a change in your health between the time you saw your physician and the day of surgery. If you take a blood thinner, please let us know. Call your ordering Doctor to make sure you can stop taking it prior to your surgery. STOP YOUR ASPIRIN to DAY PRIOR TO SURGERY. DO NOT TAKE  IBUPROFEN, ADVIL, MOTRIN, ALEVE, EXCEDRIN, BC POWDER, GOODIES, FISH OIL OR ANY MEDICATION CONTAINING ASPIRIN to DAY PRIOR TO YOUR SURGERY. MAY TAKE TYLENOL. Eat a light dinner the evening before your surgery. DO NOT EAT OR DRINK ANYTHING AFTER MIDNIGHT THE NIGHT BEFORE YOUR SURGERY. This includes water, chewing gum, lifesavers, etc.  The Pre op nurse will check with you about any medication that you may need to take the morning of surgery. Shower with a new bar of anti-bacterial soap (Dial, Safeguard) or solution given to you by Pre-op, the night before surgery. Do not use lotion, powder, deodorant on the skin after showering.   Wear loose, comfortable clothing the day of surgery and bring a container to store your contacts, eyeglasses, dentures, hearing aid, etc.  Do not bring money, valuables, jewelry, etc. to the hospital.   
 
 If you are having outpatient surgery, someone must come with you the morning of surgery to drive you home. You can not drive for 24 hours after any anesthesia. Sometimes it is necessary to stay overnight and leave the next morning. This is still considered outpatient for most insurance deductibles. Someone will still need to drive you home. If you have questions or concerns, please feel free to call Dr Karolina Lee at [phone]. If you need to cancel your surgery, please call as soon as possible. Sincerely, Mahamed Macario MD

## 2017-08-11 NOTE — MR AVS SNAPSHOT
Visit Information Date & Time Provider Department Dept. Phone Encounter #  
 8/11/2017 10:40 AM Kandy Leone MD [facility] [id number] Your Appointments 8/31/2017 10:30 AM  
POST OP 10 MIN with Kandy Leone MD  
[facility] Dr. Zuniga [facility] ([address]) Appt Note: Post/op davinci right colectomy on 8/17/17.  
 [address], [address], [address] [id number]  
[address], [address], [address], [address] [facility] [address] [address] Upcoming Health Maintenance Date Due ZOSTER VACCINE AGE 60> 8/14/1989 GLAUCOMA SCREENING Q2Y 10/14/1994 OSTEOPOROSIS SCREENING (DEXA) 10/14/1994 Pneumococcal 65+ High/Highest Risk (1 of 2 - PCV13) 10/14/1994 MEDICARE YEARLY EXAM 10/14/1994 DTaP/Tdap/Td series (1 - Tdap) 6/2/2010 INFLUENZA AGE 9 TO ADULT 8/1/2017 Allergies as of 8/11/2017  Review Complete On: 8/11/2017 By: Kandy Leone MD  
  
 Severity Noted Reaction Type Reactions Codeine  10/05/2011   Side Effect Nausea and Vomiting Current Immunizations  Never Reviewed Name Date  
 TD Vaccine 6/1/2010 Not reviewed this visit Vitals BP Pulse Temp Resp Height(growth percentile) Weight(growth percentile) 121/75 (BP 1 Location: Left arm, BP Patient Position: Sitting) 85 97.6 °F (36.4 °C) (Oral) 20 5' (1.524 m) 168 lb 9.6 oz (76.5 kg) SpO2 BMI OB Status Smoking Status 99% 32.93 kg/m2 Postmenopausal Never Smoker BMI and BSA Data Body Mass Index Body Surface Area  
 32.93 kg/m 2 1.8 m 2 Preferred Pharmacy Pharmacy Name Phone [phone] - [address], [address] [place] [address] [phone] Your Updated Medication List  
  
   
This list is accurate as of: 8/11/17 11:55 AM.  Always use your most recent med list.  
  
  
  
  
 ALLERCLEAR 10 mg tablet Generic drug:  loratadine Take 10 mg by mouth daily. ATIVAN 0.5 mg tablet Generic drug:  LORazepam  
Take  by mouth. fluticasone 50 mcg/actuation nasal spray Commonly known as:  FLONASE  
2 spray to each nostrils daily, start 2 sprays to each nostril day one x 1 week  Indications: ALLERGIC RHINITIS  
  
 NASACORT AQ 55 mcg nasal inhaler Generic drug:  triamcinolone 2 Sprays daily. OTHER Sleep aid OTC, unsure of name. valsartan 40 mg tablet Commonly known as:  DIOVAN Take 40 mg by mouth daily. To-Do List   
 08/14/2017 4:00 PM  
  Appointment with [facility] PAT ROOM P3 at [place] [address] ([phone]) Introducing [facility] & [facility] SERVICES! Sofy Tolentino introduces [employer] patient portal. Now you can access parts of your medical record, email your doctor's office, and request medication refills online. 1. In your internet browser, go to [email or web address] 2. Click on the First Time User? Click Here link in the Sign In box. You will see the New Member Sign Up page. 3. Enter your [employer] Access Code exactly as it appears below. You will not need to use this code after youve completed the sign-up process. If you do not sign up before the expiration date, you must request a new code. · [employer] Access Code: [email or web address] Expires: 10/25/2017 10:16 AM 
 
4. Enter the last four digits of your Social Security Number (xxxx) and Date of Birth (mm/dd/yyyy) as indicated and click Submit. You will be taken to the next sign-up page. 5. Create a [employer] ID. This will be your [employer] login ID and cannot be changed, so think of one that is secure and easy to remember. 6. Create a [employer] password. You can change your password at any time. 7. Enter your Password Reset Question and Answer. This can be used at a later time if you forget your password. 8. Enter your e-mail address.  You will receive e-mail notification when new information is available in ([employer]. 9. Click Sign Up. You can now view and download portions of your medical record. 10. Click the Download Summary menu link to download a portable copy of your medical information. If you have questions, please visit the Frequently Asked Questions section of the ([employer] website. Remember, ([employer] is NOT to be used for urgent needs. For medical emergencies, dial 911. Now available from your iPhone and Android! Please provide this summary of care documentation to your next provider. Your primary care clinician is listed as Nini Echevarria. If you have any questions after today's visit, please call [phone].

## 2017-08-14 NOTE — PERIOP NOTES
[facility]  Preoperative Instructions        Surgery Date 8/17/17          Time of Arrival 0600 Contact # [phone]    1. On the day of your surgery, please report to the Surgical Services Registration Desk and sign in at your designated time. The Surgery Center is located to the right of the Emergency Room. 2. You must have someone with you to drive you home. You should not drive a car for 24 hours following surgery. Please make arrangements for a friend or family member to stay with you for the first 24 hours after your surgery. 3. Do not have anything to eat or drink (including water, gum, mints, coffee, juice) after midnight 8/16/17  . ? This may not apply to medications prescribed by your physician. ?(Please note below the special instructions with medications to take the morning of your procedure.)    4. We recommend you do not drink any alcoholic beverages for 24 hours before and after your surgery. 5. Stop all Aspirin, non-steroidal anti-inflammatory drugs (i.e. Advil, Aleve), vitamins, and supplements?as directed by your surgeon's office. ? **If you are currently taking Plavix, Coumadin, or other blood-thinning agents, contact your surgeon for instructions. **    6. Wear comfortable clothes. Wear glasses instead of contacts. Do not bring any money or jewelry. Please bring picture ID, insurance card, and any prearranged co-payment or hospital payment. Do not wear make-up, particularly mascara the morning of your surgery. Do not wear nail polish, particularly if you are having foot /hand surgery. Wear your hair loose or down, no ponytails, buns, abigail pins or clips. All body piercings must be removed. Please shower with antibacterial soap for three consecutive days before and on the morning of surgery, but do not apply any lotions, powders or deodorants after the shower on the day of surgery. Please use a fresh towels after each shower.  Please sleep in clean clothes and change bed linens the night before surgery. Please do not shave for 48 hours prior to surgery. Shaving of the face is acceptable. 7. You should understand that if you do not follow these instructions your surgery may be cancelled. If your physical condition changes (I.e. fever, cold or flu) please contact your surgeon as soon as possible. 8. It is important that you be on time. If a situation occurs where you may be late, please call [phone] (OR Holding Area). 9. If you have any questions and or problems, please call [phone] (Pre-admission Testing). 10. Your surgery time may be subject to change. You will receive a phone call the evening prior if your time changes. 11.  If having outpatient surgery, you must have someone to drive you here, stay with you during the duration of your stay, and to drive you home at time of discharge. Special Instructions: none     MEDICATIONS TO TAKE THE MORNING OF SURGERY WITH A SIP OF WATER: Ativan as needed       I understand a pre-operative phone call will be made to verify my surgery time. In the event that I am not available, I give permission for a message to be left on my answering service and/or with another person?   yes     ___________________      __________   _________    (Signature of Patient)             (Witness)                (Date and Time)

## 2017-08-17 PROBLEM — C18.9 COLON CANCER (HCC): Status: ACTIVE | Noted: 2017-01-01

## 2017-08-17 NOTE — PROGRESS NOTES
TRANSFER - IN REPORT:    Verbal report received from Floerncio Coates on [place] being received from the PACU unit    Report consisted of patients Situation, Background, Assessment and   Recommendations(SBAR). Information from the following report(s) SBAR, Kardex, PACU Summary and Recent Results was reviewed . Opportunity for questions was provided.

## 2017-08-17 NOTE — BRIEF OP NOTE
BRIEF OPERATIVE NOTE    Date of Procedure: 8/17/2017   Preoperative Diagnosis: COLON CA  Postoperative Diagnosis: COLON CA    Procedure(s):  LAPAROSCOPIC RIGHT COLECTOMY WITH STAPLED INTRACORPORAL ANASTOMOSIS, ROBOT ASSISTED  Surgeon(s) and Role:     * Rometta Castleman, MD - Primary         Assistant Staff:       Surgical Staff:  Circ-1: Rj Huang  Circ-Relief: Arianne Leonardo RN  Scrub Tech-1: Dulce Molina  Scrub Tech-2: Atilio Pearson  Scrub Tech-Relief: Jacek Daley  Surg Asst-1: Ledy Kendall  Event Time In   Incision Start 3681   Incision Close      Anesthesia: General + local  Estimated Blood Loss: min  Specimens:   ID Type Source Tests Collected by Time Destination   1 : Right Colon Preservative Intestine  Rometta Castleman, MD 8/17/2017 1141 Pathology      Findings: mass of cecum. Liver unremarkable. Some scaring at calot's triangle, but gallbladder unremarkable. Hepatic flexure freed from this area. Duodenum and vital structures looked ok. Gallbladder left in place.      Complications: none    Implants: * No implants in log *

## 2017-08-17 NOTE — PERIOP NOTES
Handoff Report from Operating Room to PACU    Report received from MAHNAZ Benitez RN and Marva De CRNA regarding Mariella Streeter.      Surgeon(s):  Debi Menchaca MD  And Procedure(s) (LRB):  DAVINCI LAPAROSCOPIC RIGHT COLECTOMY  (REQ SI ROBOT) (Right)  confirmed   with allergies, dressings and local anesthetic discussed. Anesthesia type, drugs, patient history, complications, estimated blood loss, vital signs, intake and output, and last pain medication, lines, reversal medications and temperature were reviewed.

## 2017-08-17 NOTE — ANESTHESIA PREPROCEDURE EVALUATION
Anesthetic History   No history of anesthetic complications            Review of Systems / Medical History  Patient summary reviewed, nursing notes reviewed and pertinent labs reviewed    Pulmonary  Within defined limits                 Neuro/Psych         TIA and psychiatric history    Comments: Anxiety  spinal stenosis Cardiovascular    Hypertension          Hyperlipidemia    Exercise tolerance: <4 METS     GI/Hepatic/Renal               Comments: COLON CA Endo/Other        Obesity, arthritis and anemia     Other Findings   Comments: Incontinence  Hx Pulmonary emboli  DJD         Physical Exam    Airway  Mallampati: I    Neck ROM: normal range of motion   Mouth opening: Normal     Cardiovascular  Regular rate and rhythm,  S1 and S2 normal,  no murmur, click, rub, or gallop             Dental    Dentition: Caps/crowns     Pulmonary  Breath sounds clear to auscultation               Abdominal  GI exam deferred       Other Findings            Anesthetic Plan    ASA: 3  Anesthesia type: general          Induction: Intravenous  Anesthetic plan and risks discussed with: Patient

## 2017-08-17 NOTE — H&P (VIEW-ONLY)
HISTORY OF PRESENT ILLNESS  Jeremy Lynne is a 80 y.o. female. HPI Comments:   Appetite ok  BMs ok occ constipation/diarrhea, usually BM 1x/day  Rare GERD symptoms    Underwent anemia w/o with Dr. Angelo Lockwood    Hgb 8.9  EGD ok  Colonoscopy: Cecal mass: +adenocarcinoma  No prior colonoscopies    PE post knee surgery  Was on eliquis for hx of PE, stopped by Dr. Mathew Mascorro in May after negative CTA  TIA 2009    Takes daily ibuprofen  Should probably be on daily 81mg asa    CT on Monday, no metastatic Dz, +IVC filter    Has an occasional chronic cough    ____________________________________________________________________________  Patient presents with:  Mass: Pt seen at the request of Dr. Belkys Sainz to evaluate for colon cancer    /75 (BP 1 Location: Left arm, BP Patient Position: Sitting)  Pulse 85  Temp 97.6 °F (36.4 °C) (Oral)   Resp 20  Ht 5' (1.524 m)  Wt 76.5 kg (168 lb 9.6 oz)  SpO2 99%  BMI 32.93 kg/m2  Past Medical History:  No date: Arthritis  No date: Hypertension  No date: Ill-defined condition      Comment: incontinence  No date: Ill-defined condition      Comment: Fractured Cervical disc , no surgery  No date: Other ill-defined conditions      Comment: spinal stenosis  No date:  Other ill-defined conditions      Comment: elevated cholesterol  No date: Psychiatric disorder      Comment: anxiety  No date: Stroke [facility])      Comment: TIA 2009  Past Surgical History:  7/27/2017: COLONOSCOPY N/A      Comment: COLONOSCOPY performed by Jenniffer Madison MD at [facility]               ENDOSCOPY  7/27/2017: COLONOSCOPY,DIAGNOSTIC      Comment:    No date: HX BREAST BIOPSY      Comment: bilateral  No date: HX CATARACT REMOVAL      Comment: bilateral  No date: HX GYN      Comment: cyst removed from vagina  No date: HX HEENT      Comment: [place] tooth extraction  04/2015: HX ORTHOPAEDIC      Comment: Removed cyst from spinal chord  7/27/2017: UPPER GI ENDOSCOPY,BIOPSY      Comment:    Social History    Marital status:  Spouse name:                       Years of education:                 Number of children:               Social History Main Topics    Smoking status: Never Smoker                                                                Smokeless status: Never Used                        Alcohol use: Yes           0.5 oz/week       1 Shots of liquor per week       Comment: 5 drinks per week    Sexual activity: No                     Review of patient's family history indicates: Other                          Mother                    Dementia                       Mother                    Heart Attack                   Mother                    Heart Disease                  Father                    Cancer                         Father                    Stomach Cancer                 Father                    Current Outpatient Prescriptions:  triamcinolone (NASACORT AQ) 55 mcg nasal inhaler, 2 Sprays daily. OTHER, Sleep aid OTC, unsure of name. fluticasone (FLONASE) 50 mcg/actuation nasal spray, 2 spray to each nostrils daily, start 2 sprays to each nostril day one x 1 week  Indications: ALLERGIC RHINITIS  LORazepam (ATIVAN) 0.5 mg tablet, Take  by mouth.  valsartan (DIOVAN) 40 mg tablet, Take 40 mg by mouth daily. loratadine (ALLERCLEAR) 10 mg tablet, Take 10 mg by mouth daily. No current facility-administered medications for this visit. Allergies:  -- Codeine -- Nausea and Vomiting  _____________________________________________________________________________                        Mass   The history is provided by the patient. This is a new problem. The current episode started more than 1 week ago. The problem occurs constantly. The problem has not changed since onset. Pertinent negatives include no chest pain, no abdominal pain, no headaches and no shortness of breath. Nothing aggravates the symptoms. Nothing relieves the symptoms. The treatment provided no relief.        Review of Systems Constitutional: Negative for chills, fever and weight loss. HENT: Negative for ear pain. Eyes: Negative for pain. Respiratory: Negative for shortness of breath. Cardiovascular: Negative for chest pain. Gastrointestinal: Negative for abdominal pain and blood in stool. Genitourinary: Negative for hematuria. Musculoskeletal: Negative for joint pain. Skin: Negative for rash. Neurological: Negative for dizziness, focal weakness, seizures and headaches. Endo/Heme/Allergies: Does not bruise/bleed easily. Psychiatric/Behavioral: The patient does not have insomnia. Physical Exam   Constitutional: She is oriented to person, place, and time. She appears well-developed and well-nourished. No distress. HENT:   Head: Normocephalic and atraumatic. Mouth/Throat: No oropharyngeal exudate. Eyes: Pupils are equal, round, and reactive to light. Neck: Normal range of motion. No tracheal deviation present. Cardiovascular: Normal rate, regular rhythm and normal heart sounds. No murmur heard. Pulmonary/Chest: Effort normal and breath sounds normal. No respiratory distress. She has no wheezes. Abdominal: Soft. Bowel sounds are normal. She exhibits no distension and no mass. There is no tenderness. There is no rebound and no guarding. No hernia. Obese   Musculoskeletal: Normal range of motion. She exhibits no edema or tenderness. Lymphadenopathy:     She has no cervical adenopathy. Neurological: She is alert and oriented to person, place, and time. Skin: Skin is warm. No rash noted. She is not diaphoretic. No erythema. Psychiatric: She has a normal mood and affect. Her behavior is normal.       ASSESSMENT and PLAN    ICD-10-CM ICD-9-CM    1. Malignant neoplasm of ascending colon (HCC) C18.2 153.6      I had an extensive discussion with Willam Moreno and her friend regarding the risks, benefits, and alternatives of proceeding with a  Laparoscopic right colectomy, robot assisted.   Risks of surgery including the risk of anesthesia, bleeding, infection, injury to bowel, anastomotic leak, anastomotic stricture, incomplete resection, and recurrence were discussed and she is in agreement to proceed. I reviewed with Willam Moreno her increased risk of bleeding secondary to Asprin/ibuprophen use. I have asked her to discontinue the Asprin/ibuprophen for 5 days prior to surgery to reduce this risk. Hx of PE with last surgery. Has filter in. Currently has risk of bleeding. Will use SCDs/early ambulation. Consider Lovenox post op based on intraop findings. The patent will undergo preoperative evaluation and be scheduled at her earliest convenience. Bowel prep instructions were given. All questions were answered. Expressed understanding. Discussed with Dr. Erna Lugo    Thank you for this consult.

## 2017-08-17 NOTE — PROGRESS NOTES
36 - Paging Ricardo Carina regarding xray results, eKG results, and VS. Patient now with fever 100.1, abdomen hard to upper mid/left. No longer wheezing without neb given, resting comfortably. [address] returned call, updated for EKG changes, VS, and labs. Orders to transfer to telemetry bed on Stepdown at least overnight, cardiac enzymes, and consult cardiology. Supervisor notified. 0520 - Lab called and will run enzymes now, cardiology consult called and await callback from [facility].

## 2017-08-17 NOTE — ANESTHESIA POSTPROCEDURE EVALUATION
Post-Anesthesia Evaluation and Assessment    Patient: Darrin Michaels MRN: [id number]  SSN: [id number]    YOB: 1929  Age: 80 y.o. Sex: female       Cardiovascular Function/Vital Signs  Visit Vitals    BP (!) 149/93    Pulse 86    Temp 35.3 °C (95.5 °F)    Resp 20    Ht 5' (1.524 m)    Wt 78.1 kg (172 lb 2.9 oz)    SpO2 99%    BMI 33.63 kg/m2       Patient is status post general anesthesia for Procedure(s):  DAVINCI LAPAROSCOPIC RIGHT COLECTOMY  (REQ SI ROBOT). Nausea/Vomiting: None    Postoperative hydration reviewed and adequate. Pain:  Pain Scale 1: Numeric (0 - 10) (08/17/17 9407)  Pain Intensity 1: 0 (08/17/17 5065)   Managed    Neurological Status: At baseline    Mental Status and Level of Consciousness: Arousable    Pulmonary Status:   O2 Device: 02 face tent (08/17/17 1220)   Adequate oxygenation and airway patent    Complications related to anesthesia: None    Post-anesthesia assessment completed.  No concerns    Signed By: Jarrett Ellis MD     August 17, 2017

## 2017-08-17 NOTE — PERIOP NOTES
TRANSFER - OUT REPORT:    Verbal report given to VENU GRAMAJO [facility]) on Mor Germain  being transferred to U/2132(unit) for routine post - op       Report consisted of patients Situation, Background, Assessment and   Recommendations(SBAR). Information from the following report(s) SBAR, OR Summary, Intake/Output and MAR was reviewed with the receiving nurse. Opportunity for questions and clarification was provided.       Patient transported with:   O2 @ 2 liters  Tech

## 2017-08-17 NOTE — PROGRESS NOTES
Patient with increased HR in 130's and 140's, shortness of breath, wheezing, and cardiac monitoring. Dr Jac Guidry on floor to see patient. Orders for H&H, CMP, chest and abdominal xray, and xopenex breathing treatments for wheezing PRN. Patient HR currently 141.     1732: Labs drawn and sent at this time. Xray department notified and will be up shortly. 1746: Xrays done. Call from Dr. Jac Guidry - order for EKG.

## 2017-08-17 NOTE — INTERVAL H&P NOTE
H&P Update: Saritha Aguero was seen and examined. Side marked. History and physical has been reviewed. The patient has been examined.  There have been no significant clinical changes since the completion of the originally dated History and Physical.

## 2017-08-17 NOTE — PERIOP NOTES
Dr. Rey Veliz notified by telephone that patient continues to c/o abdominal pain but is better. Noted that abdomen appears to be slightly more distended & semi-firm. No nausea or emesis noted. VSS. Instructed to call again if patient becomes unstable VS or starts to vomit.

## 2017-08-17 NOTE — CONSULTS
Cardiology Consult Note    CC: post-op colectomy for Dimitris Zaldivar MD  Reason for consult:  Sinus tachycardia. Requesting MD:  Dr. Abhay Marcelino. Admit Date: 8/17/2017   Today's Date: 8/17/2017      Cardiac Assessment/Plan:   Sinus tachycardia: No specific Rx indicated: treat underlying etiology (pain, ?too much volume with surgery/?anemia etc). Dyspnea: pt reports chronic MAST for years: worse lately; Certainly could have underlying CAD. No acute ecg chagnes of ischemia; neg cardiac enzymes x1: watch. With I>>O today, will give dose of lasix IV and see how she does. Consideration/evaluation for PE per primary team.    HTN: with recent increased Cr, hold ARB for now; cover with IV metoprolol until GI working well; PRN NTG paste if BPs too high. Post-op management, CA, prior PE, GI, Dispo: all per primary team.     Hospital Problem List:  Active Problems:    Colon cancer ([facility] (8/17/2017)       ____________________________________________________________________  Seth Kaur is a 80 y.o. female presents with COLON CA  Colon cancer ([facility] Ms Cuca Wiley has a h/o HTN, PE 2016 (off anticoag 5/2017), ?pulmonary fibrosis on CTA 5/2016 (calcified cors also noted), IVC filter, anxiety and TIA 2009. Echo 9/2016: EF 60%; Mild TR; o/w unremarkable. The patient reports no chest pain/pressure; She reports gradually worsening MAST since TXU Meghan 2016. No stress testing/cath per pt. No apparent PND, orthopnea, palpitations, pre-syncope, syncope, peripheral edema PTA. She is lethargic from recent surgery; apparently noted to have increased HR so CXR/ecg done; to be moved to tele bed. ECG: @ 1753: sinus tachycardia @ 143, poor r wave progression, ?inf qs (equivocal S1Q3T3). @4046: sinus @ 119, decreased voltage, o/w unchanged from above. Tele: none  CXR: \"Lung mass versus lung nodule on the right.    CT scan of the chest recommended for further delineation.     Left basilar atelectasis and small effusion. \"  Notable labs:  Cr 1.29 from 1.19 (0.75 last year);  Hg 33.9.    ______________________________________________________________________  Patient Active Problem List    Diagnosis Date Noted    Colon cancer ([facility] 08/17/2017    Malignant neoplasm of ascending colon ([facility] 08/11/2017    Cough productive of purulent sputum 05/22/2017    Upper respiratory tract infection 05/22/2017    Pulmonary emboli ([facility] 09/22/2016    UTI (urinary tract infection) 09/22/2016    Knee osteoarthritis 09/20/2016    Spinal stenosis, lumbar region, with neurogenic claudication 04/20/2015    Anxiety 12/03/2013    Essential hypertension, benign 09/06/2012    Other and unspecified hyperlipidemia 09/06/2012    Hx-TIA (transient ischemic attack) 09/06/2012        Past Medical History:   Diagnosis Date    Arthritis     Hypertension     Ill-defined condition     incontinence    Ill-defined condition     Fractured Cervical disc , no surgery    Other ill-defined conditions     spinal stenosis    Other ill-defined conditions     elevated cholesterol    Psychiatric disorder     anxiety    Stroke [facility])     TIA 2009      Past Surgical History:   Procedure Laterality Date    COLONOSCOPY N/A 7/27/2017    COLONOSCOPY performed [facility]ad[facility]  7/27/2017         HX BREAST BIOPSY      bilateral    HX CATARACT REMOVAL      bilateral    HX GYN      cyst removed from vagina    HX HEENT      [place] tooth extraction    HX KNEE REPLACEMENT  2016    right TKR    HX ORTHOPAEDIC  04/2015    Removed cyst from spinal chord    UPPER GI ENDOSCOPY,BIOPSY  7/27/2017          Allergies   Allergen Reactions    Codeine Nausea and Vomiting      Family History   Problem Relation Age of Onset    Other Mother     Dementia Mother     Heart Attack Mother     Heart Disease Father     Cancer Father     Stomach Cancer Father       Social History     Social History    Marital status:  Spouse name: N/A    Number of children: N/A    Years of education: N/A     Occupational History    Not on file.      Social History Main Topics    Smoking status: Never Smoker    Smokeless tobacco: Never Used    Alcohol use 0.5 oz/week     1 Shots of liquor per week      Comment: 5 drinks per week    Drug use: No    Sexual activity: No     Other Topics Concern    Not on file     Social History Narrative     Current Facility-Administered Medications   Medication Dose Route Frequency    [START ON 8/18/2017] loratadine (CLARITIN) tablet 10 mg  10 mg Oral DAILY    artificial tears (dextran 70-hypromellose) (NATURAL BALANCE) 0.1-0.3 % ophthalmic solution 1 Drop  1 Drop Both Eyes PRN    sodium chloride (NS) flush 5-10 mL  5-10 mL IntraVENous Q8H    sodium chloride (NS) flush 5-10 mL  5-10 mL IntraVENous PRN    acetaminophen (TYLENOL) tablet 650 mg  650 mg Oral Q4H PRN    HYDROcodone-acetaminophen (NORCO) 5-325 mg per tablet 1-2 Tab  1-2 Tab Oral Q4H PRN    HYDROmorphone (PF) (DILAUDID) injection 0.5-1 mg  0.5-1 mg IntraVENous Q2H PRN    ondansetron (ZOFRAN) injection 4 mg  4 mg IntraVENous Q4H PRN    diphenhydrAMINE (BENADRYL) capsule 25 mg  25 mg Oral Q4H PRN    [START ON 8/18/2017] enoxaparin (LOVENOX) injection 40 mg  40 mg SubCUTAneous Q24H    nitroglycerin (NITROBID) 2 % ointment 1 Inch  1 Inch Topical Q6H PRN    [START ON 8/18/2017] alvimopan (ENTEREG) capsule 12 mg  12 mg Oral BID    metoclopramide HCl (REGLAN) injection 5 mg  5 mg IntraVENous Q6H    0.9% sodium chloride with KCl 20 mEq/L 20 mEq/L infusion        levalbuterol (XOPENEX) nebulizer soln 0.63 mg/3 mL  0.63 mg Nebulization Q4H PRN    furosemide (LASIX) injection 20 mg  20 mg IntraVENous ONCE    metoprolol (LOPRESSOR) injection 5 mg  5 mg IntraVENous Q6H    nitroglycerin (NITROBID) 2 % ointment 1 Inch  1 Inch Topical Q6H PRN        No reported FHx of early CAD or SCD    Prior to Admission Medications:  Prior to Admission medications    Medication Sig Start Date End Date Taking? Authorizing Provider   peg 400-propylene glycol (SYSTANE, PROPYLENE GLYCOL,) 0.4-0.3 % drop Administer 1 Drop to both eyes as needed. Yes Historical Provider   LORazepam (ATIVAN) 0.5 mg tablet Take  by mouth. Yes Historical Provider   valsartan (DIOVAN) 40 mg tablet Take 40 mg by mouth daily. Yes Historical Provider   loratadine (ALLERCLEAR) 10 mg tablet Take 10 mg by mouth daily. Yes Historical Provider   ibuprofen 100 mg tablet Take 100 mg by mouth every six (6) hours as needed for Pain. Historical Provider   triamcinolone (NASACORT AQ) 55 mcg nasal inhaler 2 Sprays daily. Historical Provider   OTHER Sleep aid OTC, unsure of name. Historical Provider        Review of Symptoms: Except as noted in HPI, patient denies recent fever or chills, nausea, vomiting, diarrhea, hemoptysis, hematemesis, dysuria, myalgias, focal neurologic symptoms, ecchymosis, angioedema, odynophagia, dysphagia, sore throat, earache,rash, melena, hematochezia, depression, GERD, cold intolerance, petechia, bleeding gums, or significant weight loss.      24 hr VS reviewed, overall VSSAF  Temp (24hrs), Av.8 °F (36.6 °C), Min:95.5 °F (35.3 °C), Max:100.1 °F (37.8 °C)    Patient Vitals for the past 8 hrs:   Pulse   17 1859 (!) 115   17 1819 (!) 118   17 1806 (!) 140   17 1721 (!) 140   17 1717 (!) 141   17 1716 (!) 139   17 1712 (!) 138   17 1650 (!) 133   17 1619 (!) 127   17 1600 (!) 125   17 1545 (!) 106   17 1530 100   17 1515 (!) 102   17 1500 91   17 1445 (!) 109   17 1430 87   17 1415 86   17 1400 89   17 1345 84   17 1330 80   17 1315 80   17 1300 78   17 1255 78   17 1250 77   17 1245 77   17 1230 73   17 1225 74   17 1220 74   17 1217 74   17 1215 74   17 1211 75    Patient Vitals for the past 8 hrs:   Resp   08/17/17 1819 20   08/17/17 1806 20   08/17/17 1712 20   08/17/17 1650 20   08/17/17 1619 18   08/17/17 1600 22   08/17/17 1545 24   08/17/17 1530 20   08/17/17 1515 21   08/17/17 1500 21   08/17/17 1445 17   08/17/17 1430 19   08/17/17 1415 20   08/17/17 1400 17   08/17/17 1345 20   08/17/17 1330 15   08/17/17 1315 13   08/17/17 1300 18   08/17/17 1255 16   08/17/17 1250 20   08/17/17 1245 17   08/17/17 1230 21   08/17/17 1225 20   08/17/17 1220 23   08/17/17 1217 22   08/17/17 1215 19   08/17/17 1211 17    Patient Vitals for the past 8 hrs:   BP   08/17/17 1859 133/76   08/17/17 1819 142/83   08/17/17 1806 145/84   08/17/17 1712 142/75   08/17/17 1650 (!) 151/100   08/17/17 1619 139/74   08/17/17 1600 138/89   08/17/17 1545 144/67   08/17/17 1530 (!) 149/93   08/17/17 1515 139/69   08/17/17 1500 131/64   08/17/17 1445 (!) 105/93   08/17/17 1430 91/71   08/17/17 1415 141/67   08/17/17 1400 143/66   08/17/17 1345 (!) 112/99   08/17/17 1330 133/73   08/17/17 1315 120/50   08/17/17 1300 138/68   08/17/17 1245 111/87   08/17/17 1230 (!) 113/96   08/17/17 1225 111/87   08/17/17 1220 134/60   08/17/17 1217 121/50   08/17/17 1215 121/49   08/17/17 1211 107/48          Intake/Output Summary (Last 24 hours) at 08/17/17 1903  Last data filed at 08/17/17 1545   Gross per 24 hour   Intake             2500 ml   Output              525 ml   Net             1975 ml         Physical Exam (complete single organ system exam)    Cons: The patient is no distress. Appears stated age; elderly. HEENT: Normal conjunctivae and palate. No xanthelasma. Neck: Flat JVP without appreciable HJR. Resp: Normal respiratory effort with few crackles at right base. CV: Regular rate and rhythm. PMI not palpated. Normal S1,S2  No gallop or rubs appreciated. No murmur appreciated. Intact carotid upstroke bilaterally without appreciated bruits. Abdominal aorta not palpated; no abdominal bruit noted.   Present pedal pulses. No peripheral edema. GI: No abd mass noted, soft; no organomegaly noted. Bowel sounds present. Muscular:  No significant kyphosis. Strength WNL for age. Ext: No cyanosis, clubbing, or stigmata of peripheral embolization. Derm: No ulcers or stasis dermatitis of lower extremities. Neuro: Lethargic, arouses to voice;  Grossly non-focal. Flat mood and affect. Labs:   Recent Results (from the past 24 hour(s))   POC CHEM8    Collection Time: 08/17/17  6:55 AM   Result Value Ref Range    Calcium, ionized (POC) 1.18 1.12 - 1.32 MMOL/L    Sodium (POC) 135 (L) 136 - 145 MMOL/L    Potassium (POC) 3.9 3.5 - 5.1 MMOL/L    Chloride (POC) 99 98 - 107 MMOL/L    CO2 (POC) 22 21 - 32 MMOL/L    Anion gap (POC) 19 (H) 5 - 15 mmol/L    Glucose (POC) 98 65 - 100 MG/DL    BUN (POC) 11 9 - 20 MG/DL    Creatinine (POC) 0.7 0.6 - 1.3 MG/DL    GFRAA, POC >60 >60 ml/min/1.73m2    GFRNA, POC >60 >60 ml/min/1.73m2    Hemoglobin (POC) 10.5 (L) 11.5 - 16.0 GM/DL    Hematocrit (POC) 31 (L) 35.0 - 47.0 %    Comment Comment Not Indicated. HGB & HCT    Collection Time: 08/17/17  5:30 PM   Result Value Ref Range    HGB 9.7 (L) 11.5 - 16.0 g/dL    HCT 33.9 (L) 35.0 - 56.7 %   METABOLIC PANEL, COMPREHENSIVE    Collection Time: 08/17/17  5:30 PM   Result Value Ref Range    Sodium 135 (L) 136 - 145 mmol/L    Potassium 3.8 3.5 - 5.1 mmol/L    Chloride 101 97 - 108 mmol/L    CO2 16 (L) 21 - 32 mmol/L    Anion gap 18 (H) 5 - 15 mmol/L    Glucose 154 (H) 65 - 100 mg/dL    BUN 13 6 - 20 MG/DL    Creatinine 1.29 (H) 0.55 - 1.02 MG/DL    BUN/Creatinine ratio 10 (L) 12 - 20      GFR est AA 47 (L) >60 ml/min/1.73m2    GFR est non-AA 39 (L) >60 ml/min/1.73m2    Calcium 8.0 (L) 8.5 - 10.1 MG/DL    Bilirubin, total 0.3 0.2 - 1.0 MG/DL    ALT (SGPT) 21 12 - 78 U/L    AST (SGOT) 24 15 - 37 U/L    Alk.  phosphatase 101 45 - 117 U/L    Protein, total 6.9 6.4 - 8.2 g/dL    Albumin 3.3 (L) 3.5 - 5.0 g/dL    Globulin 3.6 2.0 - 4.0 g/dL    A-G Ratio 0.9 (L) 1.1 - 2.2     TROPONIN I    Collection Time: 08/17/17  5:30 PM   Result Value Ref Range    Troponin-I, Qt. <0.04 <0.05 ng/mL   CK W/ CKMB & INDEX    Collection Time: 08/17/17  5:30 PM   Result Value Ref Range     26 - 192 U/L    CK - MB 2.4 <3.6 NG/ML    CK-MB Index 1.5 0 - 2.5     EKG, 12 LEAD, INITIAL    Collection Time: 08/17/17  5:53 PM   Result Value Ref Range    Ventricular Rate 143 BPM    Atrial Rate 143 BPM    P-R Interval 130 ms    QRS Duration 78 ms    Q-T Interval 286 ms    QTC Calculation (Bezet) 441 ms    Calculated P Axis 46 degrees    Calculated R Axis -44 degrees    Calculated T Axis 0 degrees    Diagnosis       Sinus tachycardia  Left axis deviation  Septal infarct , age undetermined  When compared with ECG of 14-AUG-2017 16:49,  Vent.  rate has increased BY  48 BPM  Septal infarct is now present  Inverted T waves have replaced nonspecific T wave abnormality in Inferior   leads       Recent Labs      08/17/17   1730   CPK  158   CKMB  2.4   CKNDX  1.5   TROIQ  <0.04       Xiomy Ross MD

## 2017-08-18 NOTE — PERIOP NOTES
Patient was transported directly from ICU to OR, intubated, accompanied by Annie Nunez CRNA, Erin Jama RN

## 2017-08-18 NOTE — PROGRESS NOTES
0430: Patient placed on HT 50 ventilator for transport to CT. SPO2 93%-95%. 0500: Patient placed back on 840 ventilator. Patient tolerated well. Patient back in CCU 2540.

## 2017-08-18 NOTE — PERIOP NOTES
TISSEAL 4ML sealant applied intra-op by Dr. Gianna Howell; lot # [id number] exp Jan. 2019 S/N [id number].

## 2017-08-18 NOTE — PROGRESS NOTES
Central Line Procedure Note    Indication: Need for vasopressors    Risks, benefits, alternatives explained and patient agrees to proceed. Patient positioned in Trendelenburg. 7-Step Sterility Protocol followed. (cap, mask sterile gown, sterile gloves, large sterile sheet, hand hygiene, 2% chlorhexidine for cutaneous antisepsis)  5 mL 1% Lidocaine placed at insertion site. Right internal jugular cannulated x 1 attempt(s) utilizing the Seldinger technique. Catheter secured & Biopatch applied. Sterile Tegaderm placed. CXR pending.     Care turned over to covering Attending MD.

## 2017-08-18 NOTE — INTERDISCIPLINARY ROUNDS
Interdisciplinary team rounds were held 8/18/2017 with the following team members:Care Management, Diabetes Treatment Specialist, Nursing, Nutrition, Pharmacy, Physician and Respiratory Therapy. Plan of care discussed. See clinical pathway and/or care plan for interventions and desired outcomes.

## 2017-08-18 NOTE — ANESTHESIA POSTPROCEDURE EVALUATION
Post-Anesthesia Evaluation and Assessment    Patient: Saritha Aguero MRN: [id number]  SSN: [id number]    YOB: 1929  Age: 80 y.o. Sex: female       Cardiovascular Function/Vital Signs  Visit Vitals    BP (!) 89/66    Pulse (!) 105    Temp 36.9 °C (98.5 °F)    Resp 14    Ht 5' (1.524 m)    Wt 78.1 kg (172 lb 2.9 oz)    SpO2 97%    BMI 33.63 kg/m2       Patient is status post general anesthesia for Procedure(s):  LAPAROTOMY EXPLORATORY WITH DUODENAL REPAIR. Nausea/Vomiting: None    Postoperative hydration reviewed and adequate. Pain:  Pain Scale 1: Numeric (0 - 10) (08/18/17 0245)  Pain Intensity 1: 10 (08/18/17 0245)   Managed    Neurological Status:   Neuro (WDL): Exceptions to WDL (08/17/17 1545)  Neuro  Neurologic State: Alert (08/17/17 2030)  Orientation Level: Oriented X4 (08/17/17 2030)  Cognition: Follows commands (08/17/17 2030)  Speech: Clear (08/17/17 2030)  LUE Motor Response: Purposeful (08/17/17 2030)  LLE Motor Response: Purposeful;Weak (08/17/17 2030)  RUE Motor Response: Purposeful (08/17/17 2030)  RLE Motor Response: Purposeful;Weak (08/17/17 2030)   At baseline    Mental Status and Level of Consciousness: Arousable    Pulmonary Status:   O2 Device: Nasal cannula (08/17/17 2030)   Adequate oxygenation and airway patent    Complications related to anesthesia: None    Post-anesthesia assessment completed.  No concerns    Signed By: Rui Perry MD     August 18, 2017

## 2017-08-18 NOTE — OP NOTES
[address], [address]   OP NOTE       Name:  Hailey Hillman   MR#:  [id number]   :  10/14/1929   Account #:  [de-identified]    Surgery Date:  2017   Date of Adm:  2017       PREOPERATIVE DIAGNOSIS: Colon cancer. POSTOPERATIVE DIAGNOSIS: Colon cancer. SURGEON: Davi Mascorro MD    PROCEDURES PERFORMED: Laparoscopic right colectomy with   stapled intracorporal anastomosis, robot-assisted. ANESTHESIA: General plus local.    ESTIMATED BLOOD LOSS: Minimal.    SPECIMENS REMOVED: Right colon. FINDINGS: The patient is noted to have a mass at the cecum. The   liver was unremarkable. No obvious gross lymphadenopathy. There   was some scarring present in the vicinity of the duodenum , Calot's   triangle and hepatic flexure. The hepatic flexure was taken down,   mobilized from this area. At the conclusion of the case, the duodenum   and vital structures looked okay. The gallbladder was unremarkable. The bowel and remaining colon were healthy-appearing and viable. COMPLICATIONS: None. 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 Risks   and alternatives of surgery were discussed with the patient and her   family and she agreed to undergo procedure as above. DESCRIPTION OF PROCEDURE: After satisfactory induction of   general anesthesia, the patient was kept in supine position. Abdomen   was prepped and draped sterilely. After skin local anesthetic, a 5 mm   optical entry trocar was placed in the left abdomen. Pneumoperitoneum was applied, abdomen was explored with findings   noted above. An 8.5 mm port was placed in the lower midline and 2   additional 8 mm ports were placed across her abdomen, as well as a stapler port. She was   placed in slight left side down position and slight Trendelenburg.  The   robot was docked, robotic instruments inserted. I began by proceeding   with a medial to lateral dissection. The right colic vessels were isolated   and divided with the vessel sealer and division of the mesentery was continued. The retroperitoneal structures were identified, including the right ureter   and duodenal sweep and the mesentery was carefully swept off of   these structures and they were protected. An appropriate position was   chosen on the terminal ileum. Due to the shortened mesentery in this   area, a few extra centimeters of ileum was taken and a window was   created in the mesentery and the division of the mesentery was   completed. The right colon was mobilized towards the midline and   hepatic flexure was taken down using a combination of blunt and   vessel sealer dissection. There was some unusual scarring near Calot   triangle, including the duodenum and proximal transverse colon. A   plane was created between the duodenum and the colon and this was   able to be mobilized without injury. A small piece of surgicel was left on this raw surface over the common bile duct. A window was created in the proximal transverse colon to define our distal resection point and the   mesentery was fully taken down. Once all the mesentery was divided,   2 mL of ICG dye was asked to be administered intravenously and the   clear demarcation of viable bowel was identified. I did also visualize   the right upper quadrant. There was quick uptake into the liver. The   duodenum that was involved with scarring and had excellent blood flow as   well. By the conclusion of the case there was some dye visible in the   common bile duct and the area otherwise appeared unremarkable. The   stapler with blue load was used to successfully fire across the terminal   ileum and the transverse colon and the specimen was placed in the   lower abdomen for later retrieval. Intracorporal side-to-side functional   end-to-end stapled anastomosis was formed.  3-0 silk suture was used   to line up the terminal ileum with the colon. Small colotomy was   created at the end of the staple line and a small enterotomy created as   well. An additional firing of the stapler completed the anastomosis. The   enterotomy was closed with a running 3-0 barbed suture followed by a   layer of 3-0 silk Lembert sutures. The anastomosis was open,   complete, appeared pink and viable. Irrigation was applied. Final   irrigation ran clear. Complete hemostasis was noted. 4 mL of Tisseel   was sprayed onto the anastomosis as well as the raw surface of Calot   triangle and the duodenum. Final inspection revealed healthy intestine   without any bleeding or leak. The robot was undocked and the stapler   port site was enlarged with a small transverse incision. The rectus   muscles were split and a small Roly wound protector was placed. The specimen was extracted and sent to Pathology. Surgical gloves   were changed, the wound protector removed. Posterior fascia   approximated with Vicryl suture, followed by some additional irrigation   and fascial closure barbed suture used in the anterior fascia, additional   Vicryl in the soft tissue, and finally all skin incisions were closed with   subcuticular Monocryl and Dermabond. The patient remained stable   during my presence in the operating room.         MD Alex Hernandez   D:  08/18/2017   11:35   T:  08/18/2017   15:30   Job #:  [id number]

## 2017-08-18 NOTE — PROGRESS NOTES
Addendum:    Reassessed and discussed with Dr. Gurinder Sanchez. Not able to ween pressors  Acidotic   Poor EF  Renal failure    Abdomen distended but soft  DENYS serous    Discussed with Ozzy Copeland. Agree with DNR    Cont current resuscitation  Transfuse  Good chance she will not survive the night.

## 2017-08-18 NOTE — PROGRESS NOTES
1945-Report received from Kendra. 2030-Patient arrived to room 2540, PCU overflow for tele monitoring, alert and oriented, S Tach, on 3LNC, sats 97%, VSS, bathed, abdomen distended, tender, semi-soft, hypoactive bowel sounds, dermabond sites intact, now resting comfortably in bed, will continue to monitor. 2105-1 tab norco given for abd pain 4/10.  2135-Pain reassessment complete, now 2/10, resting comfortably. 2300-Assisted to commode had small smear of stool, c/o abd pain after ambulating, given PRN dilaudid. 2330-Reassessed pain, patient states it is much better 2/10, now sleeping. 0000-Reassessment complete, no changes, abd still distended, semi-soft, hypoactive bowel sounds, will continue to monitor. 0128-Patient mumbling words in the room, went in to check on the patient states she is in a lot of pain 10/10 in her abdomen, BP 69/35, still alert with some confusion, increased to 6LO2 NC, labored breathing, anxious, paged intensivist.  0135-Spoke with Dr. Deena Gar, updated on patient condition, new orders for 500cc bolus now, NS at 100mL/hr, ABGs now, CBC, BMP, and lactic acid now, Vinod gtt, will continue to monitor. 0210-ABGs resulted, pt now c/o intense back pain, titrating vinod, paged intensivist.  0225-Spoke with Dr. Deena Gar, updated again, new orders received for 2 amps bicarb, levophed gtt, intubate patient, central line placement, stat abd CT scan, will continue to monitor. Paged Dr. Michelle Flanagan. Daughter informed, consents on chart. 0230-Anesthesia paged, respiratory therapist notified. 0240-Spoke with Dr. Michelle Flanagan, updated on changes in patient condition, decreased urinary output, no new orders at this time. 0250-Dr. Mijares at bedside to assess patient. 0300-DrGwenith Reek at bedside, pt intubated. 0330-DrGwenith Reek placed central line.   0420-Spoke with Dr. John Benitez, updated on patient condition, stated to still plan to take pt to CT now, plans to taking pt to OR for exploratory lap.  0425-Nursing supervisor notified. 0445-Chest xray in process to confirm line placement. 0446-Transported patient to CT.  0500-Returned from CT.  0510-Repeat am labs drawn and sent. 0520-Report given to PACU team, pt sent to OR.  0655-Pt back in room 2540, report received from PACU team  0700-Bedside report given to Zurdo Pereyra RN.

## 2017-08-18 NOTE — PROGRESS NOTES
Attended Interdisciplinary rounds in Critical Care Unit, where patient care was discussed. Visit by: Kimmy Ware. Isidra Kelley.  Jeremias Ramirez MA, [address]

## 2017-08-18 NOTE — PROGRESS NOTES
Pharmacy Automatic Renal Dosing Protocol - Antimicrobials    Indication for Antimicrobials: Sepsis of unknown origin  Current Regimen of Each Antimicrobial (Start Day & Day of Therapy):  Zosyn 3.75 gram iv q6h,  Eraxis pharmacy to dose  Significant cultures: none    CAPD, Intermittent Hemodialysis or Renal Replacement Therapy: no  Paralysis, amputations, malnutrition: no  Recent Labs      17   0201  17   1730   CREA  2.24*  1.29*   BUN  19  13   WBC  6.2   --      Temp (24hrs), Av °F (36.7 °C), Min:95.5 °F (35.3 °C), Max:100.1 °F (37.8 °C)    Creatinine Clearance (ml/min): 12.7      Impression/Plan:  Eraxis (Anidulafungin) dosing started at 200 mg now then 100 mg iv daily. Zosyn dosing adjusted to 3.375 gram iv now over 30 minutes then q12h (each dose infused over 4 hours). DEMARIO Ross          Pharmacy will follow daily and adjust doses of monitored medications as appropriate for renal function and/or serum levels. Thank you,  Anuja Corbin PHARMD           Loading dose entered? Yes   Daily BMP ordered? Yes   Maintenance dose entered? Yes   Previous order discontinued? Yes   Progress note entered? Yes   Serum Level(s) ordered?  Not applicable   Vancomycin note as daily (not prn) as a reminder for the RN to give the Vancomycin after HD        Renal Dosing Tables on the Pharmacy Web Site

## 2017-08-18 NOTE — CONSULTS
[id number]   0 [facility]       Name:  Carin Noland   MR#:  [id number]   :  10/14/1929   Account #:  [de-identified]    Date of Consultation:  2017   Date of Adm:  2017       REASON FOR CONSULTATION: I was kindly asked by Dr. Domonique Rivers to   see the patient in consultation for respiratory failure and shock. HISTORY OF PRESENT ILLNESS: The patient is an 77-year-old   woman who was admitted on 2017. She has a history of   hypertension, PE and interstitial lung disease with previous surgeries   including an IVC filter. The patient underwent, on 2017, a   laparoscopic right hemicolectomy with stapled intracorporeal   anastomosis. She apparently was moved to the ICU this evening   around 8 o'clock for unclear reasons. I was called at 1:40 a.m. on   2017 due to the patient's mild hypotension. Unfortunately,   despite IV fluid resuscitation, the patient's hypotension and respiratory   status has worsened. She received IV fluids and Vinod-Synephrine, but   unfortunately is now requiring IV Levophed. Arterial blood gas has   revealed a metabolic acidosis. The patient is anuric with rising renal   indices. Her mental status is poor, her respiratory work of breathing   has increased, her oxygenation is worse, and her abdomen is   distended and tender. Unfortunately, the patient is able to add little   information to this consultation. PAST MEDICAL HISTORY: Prior PE diagnosed in , status post   IVC filter, not currently on anticoagulation. Interstitial lung disease. Hypertension. Prior TIA in . Anxiety. Colon cancer. Previous UTIs. Osteoarthritis. Echocardiogram in 2016, mild TR, EF of   60%. FAMILY HISTORY, SOCIAL HISTORY, AND REVIEW OF SYSTEMS:   Unobtainable from this elderly, encephalopathic, critically ill patient.     PHYSICAL EXAMINATION   GENERAL: Profoundly ill-appearing encephalopathic woman,   tachypneic. VITAL SIGNS: Heart rate is 112, respiratory rate is 35, blood pressure   107/42 on Vinod-Synephrine, saturations are 92%, temperature 97.8   taken last at midnight. HEENT: Pupils round. Sclerae are anicteric. Oropharynx   unremarkable. NECK: Supple, no lymphadenopathy. LUNGS: Noteworthy for inspiratory rales. HEART: Tachycardic. I do not appreciate a murmur. ABDOMEN: Soft but distended and tender. EXTREMITIES: Trace edema. There are palpable peripheral pulses. SKIN: Without rash. DIAGNOSTIC DATA: Chest x-ray from 08/17/2017 at 6 p.m. reveals a   right lung nodule. A chest CT scan on 03/28/2017, per the radiologist,   is notable for resolution of prior PE, diffuse pulmonary interstitial   disease, a right lower lobe granuloma. LABORATORY: Arterial blood gas pH is 7.28, pCO2 of 22, pO2 of 71,   bicarbonate of 10. White count 6.2, hemoglobin 9.2, platelet count   790. Sodium 135, potassium 3.9, chloride 99, creatinine 0.7, BUN 11. IMPRESSION: Acutely ill 66-year-old woman with a history of   interstitial lung disease and prior pulmonary embolus, who underwent   a laparoscopic right colectomy on 08/17/2017. Apparently the patient   was transferred to the Intensive Care Unit earlier this evening with   hypotension. Her hypotension has persisted despite IV fluid   resuscitation and IV Vinod-Synephrine. In addition, she has developed   oliguric renal insufficiency, metabolic acidosis, respiratory failure and   an abnormal abdominal exam. Her labs are noteworthy for metabolic   acidosis and abdominal CT scan is pending. A previous CT scan from   03/2017 is notable for interstitial lung disease of unknown severity, I   am not sure if she has had outpatient evaluation. PLAN   1. Anesthesia will intubate the patient. 2. Anesthesia will place a central line. 3. Continue IV fluid resuscitation. 4. Continue Vinod-Synephrine and add Levophed. 5. Broad-spectrum antibiotics.    6. She will need imaging of her abdomen. 7. Bicarbonate. 8. GI and DVT prophylaxis. 9. N.p.o.          Yaw Guillen MD      [facility]   D:  08/18/2017   03:13   T:  08/18/2017   09:33   Job #:  [id number]

## 2017-08-18 NOTE — CDMP QUERY
Account Number: [de-identified]  MRN: [id number]  Patient: Juan C Span: [id number]  Clinician Name: Heladio Zapien RN, CCDS   KAMILAH Hyatt :  Please clarify if this patient is being treated/managed for:    => Encephalopathy, NOS, metabolic  =>Other Explanation of clinical findings  =>Unable to Determine (no explanation of clinical findings)    The medical record reflects the following clinical findings, treatment, and risk factors:    Risk Factors: surgery, respiratory failure  Clinical Indicators: postop pt became confused, mumbling words, lethargic, becoming unresponsive, in respiratory acidosis. Treatment:  Vent support, neb tx, IVF  resuscitation, IV pressors, IV abx    Please clarify and document your clinical opinion in the progress notes and discharge summary including the definitive and/or presumptive diagnosis, (suspected or probable), related to the above clinical findings. Please include clinical findings supporting your diagnosis.     Thank Kelsey Gradne RN, CCDS

## 2017-08-18 NOTE — CONSULTS
Note dictated    Impression  ========  resp failure  Shock  Renal insf/met acidosis  S/p right colectomy 8/17/17 with distended/tender abd  Hx ILDZ, PE, OA    Plan  ===  Intubation  Central line  ivf  Pressors  abx  Bicarb bolus and infusion  Abdominal inmaging  Sedation on vent  GI/DVT    Critically ill

## 2017-08-18 NOTE — ANESTHESIA PREPROCEDURE EVALUATION
Anesthetic History   No history of anesthetic complications            Review of Systems / Medical History  Patient summary reviewed, nursing notes reviewed and pertinent labs reviewed    Pulmonary  Within defined limits                 Neuro/Psych       CVA  TIA and psychiatric history    Comments: Anxiety  spinal stenosis Cardiovascular    Hypertension          Hyperlipidemia    Exercise tolerance: <4 METS     GI/Hepatic/Renal               Comments: COLON CA Endo/Other        Obesity, arthritis and anemia     Other Findings   Comments: Septic, on heavy pressors  Respiratory failure, on vent    Incontinence  Hx Pulmonary emboli  DJD         Physical Exam    Airway  Mallampati: I    Neck ROM: normal range of motion   Mouth opening: Normal  Intubated   Cardiovascular    Rhythm: regular  Rate: abnormal        Comments: Tachycardic  Dental    Dentition: Caps/crowns     Pulmonary  Breath sounds clear to auscultation               Abdominal  GI exam deferred       Other Findings            Anesthetic Plan    ASA: 4, emergent  Anesthesia type: general    Monitoring Plan: CVP and BIS      Induction: Intravenous  Anesthetic plan and risks discussed with: Patient

## 2017-08-18 NOTE — PROGRESS NOTES
Pharmacy  LM[facility] Monitoring     Enoxaparin Indication: dvt prophylaxis  Current Dose: 40 mg daily  Creatinine Clearance: 15  Recent Labs      08/18/17   0915  08/18/17   0510  08/18/17   0201  08/17/17   1730   CREA  2.35*  2.32*  2.24*  1.29*   BUN  21*  21*  19  13   HGB  8.3*   --   9.2*  9.7*   PLT  205   --   221   --      Wt Readings from Last 1 Encounters:   08/17/17 78.1 kg (172 lb 2.9 oz)   Body mass index is 33.63 kg/(m^2).     Impression/Plan: decreased enoxaparin to 30 mg daily for reduced CrCl     Thanks,  Lenny Downs, PHARMD

## 2017-08-18 NOTE — PROGRESS NOTES
Intubation Note    Called to bedside secondary to  impending respiratory failure. Patient pre-oxygenated with 100% oxygen. Smooth RSI with propofol 50 mg + midazolam 5 mg IV. DVL x 1 with Glidescope    7.0 ETT taped and secured at 21 cm at the teeth.    + Bilateral BS, + Chest rise, + ETCO2    CXR pending.     Care turned over to covering Attending MD.

## 2017-08-18 NOTE — CONSULTS
[facility] INTENSIVIST Consult Service Note  Pulmonary, Critical Care, and Sleep Medicine    Name: Willam Moreno MRN: [id number]   : 10/14/1929 Hospital: [facility]   Date: 2017   Hospital Day: 2       Subjective/Interval History:   I have reviewed the flowsheet and previous days notes. Seen earlier today on rounds. Reviewed the evaluation and will assist in implementing plan as outlined by Dr. Domonique Rivers and team      IMPRESSION:   1. Acute respiratory failure- on vent- not ready for SAT  2. S/p right colectomy 17 with distended/tender abd for colon cancer  3. Hx ILDZ  4. Severe sepsis and shock  5. Perforated duodennal ulcer  6. S/p emergency laparotomy  7. Metabolic acidosis  8. Colon cancer ([address] (2017) Underwent anemia w/o with Dr. Shell Lovell Hgb 8.9 EGD ok; Colonoscopy: Cecal mass: +adenocarcinoma;   9. PE post knee surgery; IVC filter; was on eliquis for hx of PE, stopped by Dr. Cade Keenan in May after negative CTA   10. TIA    11. Alcohol use: Yes           0.5 oz/week     1 Shots of liquor per week     Comment: 5 drinks per week  12. Hypertension  13. Hyperlipidemia  14. Incontinence  15. DJD  16. CVA TIA and psychiatric history  17. Obesity, arthritis and anemia  18. Pt is critically ill and at high risk of end organ dysfunction and failure  19. Critical care time with pt exclusive of procedures    55    minutes      RECOMMENDATIONS/PLAN:   1. CCU  2. Vent  3. ARDS protocol low peep  4. Watch renal function, may need help from nephrology  5. Pressors  6. abx  7. Bicarb bolus and infusion  8. Sedation on vent  9. GI/DVT  10. DVT, SUP prophylaxis. 11. Will be available to assist in medical management while in the CCU pending disposition     Risk of deterioration: high   [x] High complexity decision making was performed  [x] See my orders for details  Tubes:   [place], NGT and Intubated/Vent  ICU disposition :Unchanged.     Code: Prior Subjective/Initial History:   I have reviewed the flowsheet and previous days notes. The patient is unable to give any meaningful history or review of systems because the patient is:  intubated     I was asked by Mala Zimmerman MD to see Seth Salmeronmallory in consultation for a chief complaint of acute respiratory failure and severe sepsis with shock    Pt was admitted for elective colon resection of cecal colon cancer. Post op she was on general surgery service and became unstable. Pt moved to CCU for telemetry but declined further. Was septic and ultimated intubated by anesthesia. Taken back to OR by  for ex lap and a perforated duodenal ulcer found and patched. Pt now in CCU on vent, multiple pressors. Pt unable to give any history. She is critically ill. Seen emergently by my partner early AM before returning to OR. Notes reviewed. Pt has a h/o HTN, PE 2016 (off anticoag 5/2017), ?pulmonary fibrosis on CTA 5/2016 (calcified cors also noted), IVC filter, anxiety and TIA 2009. Echo 9/2016: EF 60%; Mild TR; o/w unremarkable. She reports gradually worsening MAST since [facility] 2016. No stress testing/cath per pt. PMH:  has a past medical history of Arthritis; Hypertension; Ill-defined condition; Ill-defined condition; Other ill-defined conditions; Other ill-defined conditions; Psychiatric disorder; and Stroke ([facility] [address] PSH:   has a past surgical history that includes upper gi endoscopy,biopsy (7/27/2017); colonoscopy,biopsy (7/27/2017); colonoscopy (N/A, 7/27/2017); gyn; breast biopsy; cataract removal; heent; orthopaedic (04/2015); and knee replacement (2016). FHX: family history includes Cancer in her father; Dementia in her mother; Heart Attack in her mother; Heart Disease in her father; Other in her mother; Stomach Cancer in her father. SHX:  reports that she has never smoked.  She has never used smokeless tobacco. She reports that she drinks about 0.5 oz of alcohol per week  She reports that she does not use illicit drugs. ROS:Review of systems not obtained due to patient factors.     MAR reviewed and pertinent medications noted or modified as needed    Allergies   Allergen Reactions    Codeine Nausea and Vomiting        MEDS:   Current Facility-Administered Medications   Medication    PHENYLephrine (NEOSYNEPHRINE) 100,000 mcg in 0.9% sodium chloride 250 mL infusion    NOREPINEPHrine (LEVOPHED) 16 mg/250 ml D5W infusion    sodium bicarbonate 8.4 % (1 mEq/mL) injection    succinylcholine (ANECTINE) 20 mg/mL injection    midazolam (VERSED) 1 mg/mL injection    propofol (DIPRIVAN) infusion    fentaNYL citrate (PF) injection 50 mcg    midazolam (VERSED) 1 mg/mL injection    sodium bicarbonate (8.4%) 150 mEq in dextrose 5% 1,000 mL infusion    vasopressin (VASOSTRICT) 20 Units in 0.9% sodium chloride 100 mL infusion    [START ON 8/19/2017] anidulafungin (ERAXIS) 100 mg in 0.9% sodium chloride 130 mL IVPB    piperacillin-tazobactam (ZOSYN) 3.375 g in 0.9% sodium chloride (MBP/ADV) 100 mL    piperacillin-tazobactam (ZOSYN) 3.375 g in 0.9% sodium chloride (MBP/ADV) 100 mL    lactated Ringers infusion    sodium chloride (NS) flush 5-10 mL    sodium chloride (NS) flush 5-10 mL    lidocaine (PF) (XYLOCAINE) 10 mg/mL (1 %) injection 0.1 mL    lactated Ringers infusion    sodium chloride (NS) flush 5-10 mL    fentaNYL citrate (PF) injection 25 mcg    HYDROmorphone (PF) (DILAUDID) injection 0.2 mg    diphenhydrAMINE (BENADRYL) injection 12.5 mg    meperidine (DEMEROL) injection 12.5 mg    artificial tears (dextran 70-hypromellose) (NATURAL BALANCE) 0.1-0.3 % ophthalmic solution 1 Drop    sodium chloride (NS) flush 5-10 mL    sodium chloride (NS) flush 5-10 mL    acetaminophen (TYLENOL) tablet 650 mg    HYDROmorphone (PF) (DILAUDID) injection 0.5-1 mg    ondansetron (ZOFRAN) injection 4 mg    enoxaparin (LOVENOX) injection 40 mg    mupirocin (BACTROBAN) 2 % ointment        Telemetry: normal sinus rhythm    Objective:     Vital Signs: Intake/Output: Intake/Output:   Visit Vitals    BP (!) 89/66    Pulse (!) 105    Temp 98.5 °F (36.9 °C)    Resp 14    Ht 5' (1.524 m)    Wt 78.1 kg (172 lb 2.9 oz)    SpO2 97%    BMI 33.63 kg/m2         O2 Device: Nasal cannula  O2 Flow Rate (L/min): 3 l/min    Wt Readings from Last 4 Encounters:   17 78.1 kg (172 lb 2.9 oz)   17 81 kg (178 lb 9.2 oz)   17 76.5 kg (168 lb 9.6 oz)   17 77.7 kg (171 lb 6 oz)       Temp (24hrs), Av.1 °F (36.7 °C), Min:95.5 °F (35.3 °C), Max:100.1 °F (37.8 °C)     Intake/Output Summary (Last 24 hours) at 17 0732  Last data filed at 17 0640   Gross per 24 hour   Intake             3500 ml   Output              815 ml   Net             2685 ml     Last shift:         Last 3 shifts:  1901 -  0700  In: 3500 [I.V.:3500]  Out: 815 [Urine:665]     Hemodynamics:    CO:    CI:    CVP:    SVR:   PAP Systolic:    PAP Diastolic:    PVR:    AP84:        Ventilator Settings:      Mode Rate TV Press PEEP FiO2 PIP Min. Vent   Assist control    400 ml    6 cm H20 60 %  23 cm H2O  5.52 l/min        Physical Exam:    General: severely ill elderly WF on vent   HEENT: NCAT, intubated   Neck, Lymph: No abnormally enlarged lymph nodes. Chest: normal   Lungs: decreased air exchange bilaterally   Heart: Regular rate and rhythm   Abdomen: soft, distended   :    Extremity: negative, cyanosis, clubbing;     Neuro: obtunded   Psych: unable to assess, sedated   Skin: Pale;   Pulses: Bilateral, Radial, 0-1    Capillary refill: abnormal:  sluggish capillary refill, pale;    Data:   Interval lab and diagnostic data was reviewed. Interval radiology images were independently viewed and available reports were reviewed. All lab results for the last 24 hours reviewed.           Labs:    Recent Labs      17   0201  17   1730   WBC  6.2   --    HGB  9.2*  9.7*   PLT  221   --      Recent Labs 08/18/17   0510  08/18/17   0202  08/18/17   0201  08/17/17   1730   NA  137   --   136  135*   K  3.5   --   4.0  3.8   CL  104   --   106  101   CO2  18*   --   13*  16*   GLU  143*   --   117*  154*   BUN  21*   --   19  13   CREA  2.32*   --   2.24*  1.29*   CA  7.3*   --   7.7*  8.0*   LAC   --   11.2*   --    --    ALB  2.3*   --   2.5*  3.3*   SGOT  45*   --   25  24   ALT  22   --   17  21     Recent Labs      08/18/17   0417  08/18/17   0208   PH  7.35  7.28*   PCO2  27*  22*   PO2  75*  71*   HCO3  15*  10*   FIO2  60   --      Recent Labs      08/18/17   0510  08/18/17   0201  08/17/17   1730   CPK  618*  361*  158   CKNDX  2.3  2.2  1.5   TROIQ  0.06*  0.04  <0.04     No results found for: BNPP, BNP   Lab Results   Component Value Date/Time    Culture result: MRSA NOT PRESENT 09/06/2016 02:19 PM    Culture result:  09/06/2016 02:19 PM         Screening of patient nares for MRSA is for surveillance purposes and, if positive, to facilitate isolation considerations in high risk settings. It is not intended for automatic decolonization interventions per se as regimens are not sufficiently effective to warrant routine use.     Culture result: CITROBACTER FREUNDII 09/06/2016 02:19 PM    Culture result: PROTEUS MIRABILIS 09/06/2016 02:19 PM     Lab Results   Component Value Date/Time     08/18/2017 05:10 AM     08/18/2017 02:01 AM     Lab Results   Component Value Date/Time    Color YELLOW/STRAW 08/14/2017 04:34 PM    Appearance CLEAR 08/14/2017 04:34 PM    Specific gravity 1.025 04/21/2015 04:55 PM    pH (UA) 6.0 08/14/2017 04:34 PM    Protein NEGATIVE  08/14/2017 04:34 PM    Glucose NEGATIVE  08/14/2017 04:34 PM    Ketone NEGATIVE  08/14/2017 04:34 PM    Bilirubin NEGATIVE  08/14/2017 04:34 PM    Blood NEGATIVE  08/14/2017 04:34 PM    Urobilinogen 0.2 08/14/2017 04:34 PM    Nitrites NEGATIVE  08/14/2017 04:34 PM    Leukocyte Esterase NEGATIVE  08/14/2017 04:34 PM    WBC 0-4 08/14/2017 04:34 PM RBC 0-5 08/14/2017 04:34 PM    Bacteria NEGATIVE  08/14/2017 04:34 PM       No results found for: IRON, FE, TIBC, IBCT, PSAT, FERR  Lab Results   Component Value Date/Time    Sed rate (ESR) 7 10/15/2009 03:15 AM    C-Reactive protein 0.27 10/15/2009 03:15 AM    RPR NONREACTIVE 10/15/2009 03:15 AM    TSH 8.68 08/18/2017 02:01 AM      Lab Results   Component Value Date/Time    RPR NONREACTIVE 10/15/2009 03:15 AM         Imaging:  I have personally reviewed the patients radiographs and have reviewed the reports:          Results from Hospital Encounter encounter on 08/17/17   XR CHEST PORT   Narrative EXAM:  XR CHEST PORT    INDICATION:  Tube Placement - post line and intubation    COMPARISON:  Chest x-ray 8/17/2017. FINDINGS: A portable AP radiograph of the chest was obtained at 04:13 hours. The  patient is on a cardiac monitor. The endotracheal tube projects over the  tracheal lucency with the tip approximately 4 cm above the [place]. Central  venous catheter projects in expected course for right internal jugular vein  approach with the tip projecting over the expected location of the distal  superior vena cava. The lungs are hypoinflated with bronchovascular crowding  and bibasilar atelectasis with no pneumothorax. The cardiac and mediastinal  contours and pulmonary vascularity are normal.  Atherosclerotic calcifications  affect the aortic arch and the thoracic aorta is tortuous. The chest wall  structures and visualized upper abdomen show no acute findings with incidental  note of degenerative spine and shoulder changes. Impression IMPRESSION: Tubes and lines above. Low-volume exam with bibasilar atelectasis. Results from [place] encounter on 08/17/17   CT ABD PELV WO CONT   Narrative EXAM:  CT ABD PELV WO CONT    INDICATION: Abdominal pain    COMPARISON: CT 8/7/2017. CONTRAST:  None. TECHNIQUE:   Thin axial images were obtained through the abdomen and pelvis.  Coronal and  sagittal reconstructions were generated. Oral contrast was not administered. CT  dose reduction was achieved through use of a standardized protocol tailored for  this examination and automatic exposure control for dose modulation. The absence of intravenous contrast material reduces the sensitivity for  evaluation of the solid parenchymal organs of the abdomen. FINDINGS:     LUNG BASES: Right greater left pleural effusions with bibasilar atelectasis. Calcified granuloma right base. Aerated lungs are clear. INCIDENTALLY IMAGED HEART AND MEDIASTINUM: The visualized heart is normal in  size without pericardial effusion. Coronary artery calcifications are seen. Tip  of central venous catheter seen in the distal SVC. LIVER: Unremarkable. GALLBLADDER: Unremarkable. SPLEEN: Unremarkable. PANCREAS: Diffusely atrophic with no mass or ductal dilation. ADRENALS: Unremarkable. KIDNEYS: No mass, calculus, or hydronephrosis. STOMACH: Unremarkable. SMALL BOWEL: No dilatation or wall thickening. COLON: Status post right colectomy. There is circumferential thickening of the  bowel wall at the anastomotic site in the right mid abdomen. Noninflamed  appearing sigmoid diverticula. Otherwise normal.  APPENDIX: Surgically absent. PERITONEUM: Free fluid and free intraperitoneal air likely related to recent  exploratory laparotomy. RETROPERITONEUM: Atherosclerotic calcification of the aorta without aneurysm or  dissection. No enlarged lymphadenopathy. IVC filter in expected position. REPRODUCTIVE ORGANS: Calcified fibroids within otherwise unremarkable uterus. Ovaries unremarkable. URINARY BLADDER: Collapsed around Carlton catheter balloon. BONES: Degenerative spine changes with no acute fracture or aggressive lesion. ADDITIONAL COMMENTS: N/A         Impression IMPRESSION: Localized thickening at the ileocolonic anastomosis concerning for  inflammation versus neoplasm.  Ascites and free intraperitoneal air likely  related to expiratory laparotomy. Incidental findings as above. My assessment/plan was discussed with:  nursing    respiratory therapy Dr. hensley      I have provided   55    minutes of critical care time rendering care exclusive of any procedures. During this entire length of time I was immediately available to the patient.      The reason for providing this level of medical care was due to a critical illness that impaired one or more vital organ systems, such that there was a high probability of imminent or life threatening deterioration in the patient's condition. Pt's condition is unstable and unpredictable. This care involved high complexity decision making which includes reviewing the patient's past medical records, current laboratory results, medications that were immediately available to me and actual Xray films in order to assess, support vital system function, and to treat this degree of vital organ system failure, and to prevent further life threatening deterioration of the patients condition. Thank you for allowing us to participate in the care of this patient. We will be happy to follow along with you.     Shirin Fonseca MD

## 2017-08-18 NOTE — PROGRESS NOTES
0800: Increased FiO2 to 80%. Pt SpO2 trending down to 89%. SpO2 increased to 95%. 4533: Dr. Maurilio Sheikh aware and he will initiate ARDS Net protocol with low peep algorithm. 7465: FiO2 decreased to 60% and PEEP adjusted 10 cmH2O adjustments per ARDSNet protocol algorithm. Will continue to adjust to meet patient parameters; please refer to ventilator flowsheet/comments.

## 2017-08-18 NOTE — CDMP QUERY
1 of 3  Account Number: [de-identified]  MRN: [id number]  Patient: Koko Brizuela: [id number]  Clinician Name: Nilda Walter RN, CCDS   KAMILAH Greene :  Please clarify if this patient is being treated/managed for:    =>  SIRS w/ acute organ dysfunction in the setting of perforated duodenum postop requiring multiple pressors & surgical repair  => Sepsis post op   =>Other Explanation of clinical findings  =>Unable to Determine (no explanation of clinical findings)    The medical record reflects the following clinical findings, treatment, and risk factors:    Risk Factors: Colon Ca,  Colonic diverticulosis. Clinical Indicators: S/p right colectomy 8/17/17, pt c/o abdominal pain, abdominal distention & slightly firm,  becoming tachycardic 130-140, confused, SOB,  hypotensive, oliguric renal insufficiency. Treatment: IV abx, IV pressors, IVF bolus, surgical repair of perf duodenum, mechanical vent support, & ICU care. Please clarify and document your clinical opinion in the progress notes and discharge summary including the definitive and/or presumptive diagnosis, (suspected or probable), related to the above clinical findings. Please include clinical findings supporting your diagnosis.     Thank Sarai Kaur RN, CCDS

## 2017-08-18 NOTE — PROGRESS NOTES
1900-Bedside report received from Zurdo Pereyra, RN, daughter at bedside. 2000-Shift assessment complete, sedated, PERRL, skin mottled, cool to the touch, Sinus Tach, carmina infusing at 225mcg, levophed infusing at 50mcg, and vasopressin infusing at 0.03u/min, BP 96/53, a-line leveled and correlating with cuff pressures, NGT connected to suction with little output, placement verified, minimal urinary output, will continue to monitor. 2130-Skin now dusky, more mottling, daughter stated she still wanted to check blood gases and treat accordingly, paged respiratory therapist for ABG. 2140-Respiratory therapist at bedside. 2148-Spoke with respiratory therapist regarding ABG results, paged on-call intensivist.  2155-Spoke with Dr. Dorcas Claude regarding critical ABGs, new orders for 3amps bicarb now, and to recheck ABGs as needed throughout the night, will continue to monitor. 0000-Reassessment complete, skin cold, increase in mottling, lips blue, unable to  pulse ox, tried multiple sensors, BP labile, paged respiratory for ABGs. 0015-ABG results obtained, paged on-call intensivist.  0045-Spoke with Dr. Dorcas Claude regarding low blood sugars and ABG results, discussed with daughter patient condition, states, \"I want to give my mom 24hours and I understand this might only be prolonging the enevitable\", explained to Dr. Dorcas Claude, new orders received for 3 amps bicarb now, D10 gtt 50mL/hr, repeat ABG in am, will continue to monitor. 0200-Noted increased swelling around neck and chest, no crepitus, called xray to have routine am chest xray obtained early, will continue to monitor.    0400-Reassessment complete, remains unresponsive and sedated on propofol gtt, carmina, levo, and vasopressin continued to maintain BP, pupils remain round and equal, now sluggish, mottled skin, slightly warm to the touch now, NGT now with brown output, guillaume drain patent and charged continuing to put out serosanguinous fluid, am labs drawn and sent, daughter remains at bedside, now tearful, states \"my daughter will be here at 5am, I feel guilty for keeping her like this\" states she wants to discuss plan of care this am/potential withdrawal of care. 0410-Spoke with [facility] coordinator Guilherme Adamson, informed of patient condition and GCS, stated due to age pt is not a candidate for organ procurement, requesting notification at time of death. 0530-Patients granddaughter and  now at bedside. 0535-Daughter stated she would like the MD paged and that she feels it is time to withdraw care. 0538-Paged on-call intensivist.  0545-Spoke with Dr. Sil Hopson, new orders DNR status, comfort measures only, compassionate extubation, prn dilaudid 1-2mg q15min, prn fentanyl 50mcg q15min.  0602-PRN fentanyl and dilaudid given. 0604-Respiratory at bedside, pt extubated. 0610-Asystole, time of death. 0630-Family members took belongings home, stated [facility] in [place] is  home of choice, provided support,  at bedside, Dr. Sabiha Smith notified, Dr. Shaun Michael at bedside to pronounce, nursing supervisor notified, and [facility] notified. 0710-Transport to [facility], chart sent to nursing supervisor.

## 2017-08-18 NOTE — PROGRESS NOTES
Pt assessed several times since surgery. Initially was c/o SOB and pain with inspiration  Some improvement with pain meds. Was actually up at one point and even had a small BM. However, her abdomen is currently tight and distended. She is in resp failure/shock    Appreciate assessment by Dr. Jai Ragland and Dr. Isidro Hutchinson    CT scan of abd being done, but of limited utility this soon after surgery. Need to be concerned about an intra-abdominal process, as another cause for her shock has not been identified. Reviewed proceeding with an exploratory laparotomy with pt's daughter Moni Herrera). Risks of surgery including the risk of anesthesia, bleeding, infection, injury to bowel, leak, worsening condition and death were discussed. This maybe a negative exploration, however, we do not want to miss something like a bowel leak and risk her condition getting worse. Daughter expressed understanding. All questions were answered.

## 2017-08-18 NOTE — BRIEF OP NOTE
BRIEF OPERATIVE NOTE    Date of Procedure: 8/18/2017   Preoperative Diagnosis: Septic Shock  Postoperative Diagnosis: Septic Shock, PERFORATED DUODENUM    Procedure(s):  EXPLORATORY LAPAROTOMY WITH DUODENAL REPAIR  Surgeon(s) and Role:     * Morgan Lacy MD - Primary         Assistant Staff:       Surgical Staff:  Circ-1: Jermaine Birmingham RN  Scrub Tech-1: Maritza Sanches  Scrub Tech-2: Lorena Davidson  Surg Asst-1: Weston Cordova  Event Time In   Incision Start 4949   Incision Close 4193     Anesthesia: General + local  Estimated Blood Loss: 10 ml  Specimens: * No specimens in log *   Findings: small perforation in duodenum, mucosal normal, no bleeding, anastomosis intact.       Complications: none  Implants: * No implants in log *

## 2017-08-18 NOTE — PROGRESS NOTES
Pt remains critically ill. Discussed with pts daughter; Shayy Villanueva at bedside. She understands how sick her mother is and states her mother would not want to be maintained like this. Will continue this current level of support. Reviewed the pts labs. Pt has ongoing severe metabolic acidosis; lactate from 11 to 18. Ongoing high vasopressor support. Echo: reveals severe cardiomyopathy with LVEF of 20%  Pt also noted to have hypoglycemia. Will add hydrocortisone 100mg iv every 8 hrs. Discussed with Dr. Cuca Villela. Critically ill, 35 min CC, EOP.

## 2017-08-18 NOTE — PROGRESS NOTES
Cardiology Progress Note  8/18/2017    Admit Date: 8/17/2017  Admit Diagnosis: COLON CA  [facility]  status post  CC:  None currently      Cardiac Assessment/Plan:   Shock: progressive; on high dose pressors (levo 30; carmina 200, vasopressin 0.02; bicarb gtt @ 100; I>>O). Likely septic/distributive shock. Echo ordered: cont supportive care. Sinus tachycardia: No specific Rx indicated: now related to pressors.     Dyspnea: pt reports chronic MAST for years: worse lately; Certainly could have underlying CAD. No acute ecg chagnes of ischemia yesterday; Trop remain neg. Consideration/evaluation for PE per primary team.     HTN: Now marked hypotension; obviously cont to hold ARB/bblockers. Oliguric TOYA, Post-op management, CA, prior PE, GI, Dispo: all per primary team and consultants.      I will not be here this weekend nor next week. My partners will see the patient in my absence. D/W pt's daughter this am.    Hospital Problem List:  Active Problems:    Colon cancer ([facility] (8/17/2017)         ____________________________________________________________________  Eugenie Kelley is a 80 y.o. female presents with COLON CA  Colon cancer ([facility]    [facility] has a h/o HTN, PE 2016 (off anticoag 5/2017), ?pulmonary fibrosis on CTA 5/2016 (calcified cors also noted), IVC filter, anxiety and TIA 2009. Echo 9/2016: EF 60%; Mild TR; o/w unremarkable.     As noted 8/17 evening: \"The patient reports no chest pain/pressure; She reports gradually worsening MAST since TXU Meghan 2016. No stress testing/cath per pt.     No apparent PND, orthopnea, palpitations, pre-syncope, syncope, peripheral edema PTA.     She is lethargic from recent surgery; apparently noted to have increased HR so CXR/ecg done; to be moved to tele bed.     ECG: @ 1753: sinus tachycardia @ 143, poor r wave progression, ?inf qs (equivocal S1Q3T3).                         @4266: sinus @ 119, decreased voltage, o/w unchanged from above. Tele: none yesterday  CXR: \"Lung mass versus lung nodule on the right. CT scan of the chest recommended for further delineation.      Left basilar atelectasis and small effusion. \"  Notable labs:  Cr 1.29 from 1.19 (0.75 last year); Hg 33. 9. \"    Taken to OR early am  for distended abd/progressive shock: found to have duodenal perf. Currently intubated.     For other plans, see orders. High complexity decision making was performed  X Yes   High-risk of decompensation with multiple organ involvement X Yes   Hospital problem list   Active Hospital Problems    Diagnosis Date Noted    Colon cancer ([facility] 2017                                                         Subjective:  Patient reports  [x]   nothing; unable to communicate    [x]   intubated   Chest pain  none  consistent with:  Non-cardiac CP         Atypical CP     None now  On going  Anginal CP     Dyspnea  none  at rest  with exertion         improved  unchanged  worse              PND  none  overnight       Orthopnea  none  improved  unchanged  worse   Presyncope  none  improved  unchanged  worse   Ambulated in hallway without symptoms   Yes   Ambulated in room without symptoms  Yes     ROS Hematuria:  Yes X No Dysuria:  Yes X No                (2+  other systems) Cough: Yes X No Sputum: Yes X No                 BRBPR:  Yes X No Melena: Yes X No   No change in family and social history from H&P/Consult note.   Objective:    Physical Exam:  24 hr VS reviewed, overall VSSAF  Temp (24hrs), Av °F (36.7 °C), Min:95.5 °F (35.3 °C), Max:100.1 °F (37.8 °C)    Patient Vitals for the past 8 hrs:   Pulse   17 0804 (!) 107   17 0800 (!) 108   17 0702 (!) 105   17 0657 (!) 105   17 0510 (!) 126   17 0420 (!) 121   17 0410 (!) 116   17 0405 (!) 117   17 0402 (!) 116   17 0400 (!) 115   17 0355 (!) 115   17 0352 (!) 114   17 0350 (!) 115   17 0330 (!) 109 08/18/17 0325 (!) 104   08/18/17 0315 (!) 112   08/18/17 0245 (!) 112   08/18/17 0240 (!) 118   08/18/17 0230 (!) 109   08/18/17 0223 (!) 109   08/18/17 0215 (!) 110   08/18/17 0200 (!) 111   08/18/17 0137 (!) 109   08/18/17 0134 (!) 110   08/18/17 0128 (!) 103    Patient Vitals for the past 8 hrs:   Resp   08/18/17 0804 15   08/18/17 0800 16   08/18/17 0702 14   08/18/17 0657 14   08/18/17 0510 (!) 37   08/18/17 0420 (!) 31   08/18/17 0410 30   08/18/17 0405 (!) 32   08/18/17 0402 (!) 32   08/18/17 0400 (!) 31   08/18/17 0355 30   08/18/17 0352 30   08/18/17 0350 (!) 32   08/18/17 0330 30   08/18/17 0325 27   08/18/17 0315 (!) 33   08/18/17 0245 (!) 31   08/18/17 0240 28   08/18/17 0230 (!) 32   08/18/17 0223 30   08/18/17 0215 (!) 36   08/18/17 0200 (!) 36   08/18/17 0137 (!) 31   08/18/17 0134 (!) 36   08/18/17 0128 (!) 33    Patient Vitals for the past 8 hrs:   BP   08/18/17 0800 92/52   08/18/17 0702 (!) 89/66   08/18/17 0510 139/59   08/18/17 0420 128/61   08/18/17 0410 104/48   08/18/17 0405 130/50   08/18/17 0402 129/58   08/18/17 0400 128/51   08/18/17 0355 123/48   08/18/17 0352 133/54   08/18/17 0350 132/60   08/18/17 0330 137/64   08/18/17 0315 95/49   08/18/17 0245 102/55   08/18/17 0240 116/43   08/18/17 0230 99/47   08/18/17 0223 (!) 100/37   08/18/17 0215 (!) 89/48   08/18/17 0200 (!) 75/46   08/18/17 0137 (!) 75/42   08/18/17 0134 (!) 81/40   08/18/17 0128 (!) 69/35        Intake/Output Summary (Last 24 hours) at 08/18/17 0830  Last data filed at 08/18/17 0640   Gross per 24 hour   Intake          4672.56 ml   Output              825 ml   Net          3847.56 ml     General: x WD,WN x Elderly  Cachetic  NAD     Agitated  Lethargic  Arousable  Obtunded    x Sedated  On Bipap x Intubated                ENT/Palate: X WNL  Dry MM  anicteric                Respiratory: X CTA  Nl resp effort  Increased effort  No significant change     rhonchi  rales  improved  worse              Cardiovasc: X RRR  IRRR X Nl S1, S2 x No rub     No murmur X No new murmur  Murmur c/w: x No gallop    x No edema  BLE edema:+  RLE edema:+  LLE edema:+     Edema less  Edema more  Edema same  Edema worse    X Nl JVP  Elevated JVP  JVP same  JVP worse    X Carotid wnl x abd aorta not palpated X no peripheral emboli noted                GI: X abd post surgical x nondistended X BS not present X No organo- megaly noted              Skin:  Warm, dry x Cold extremites                  Neuro:  A/O  Grossly non- focal  Obtunded x Sedated     Lethargic  Arousable x intubated       cath site intact w/o hematoma or new bruit; distal pulse unchanged  Yes   Data Review:     Telemetry independently reviewed x sinus  chronic afib  parox afib  NSVT     ECG independently reviewed  NSR  afib  no significant changes  NSST-Tw chgs   x no new ECG provided for review   Lab results reviewed as noted below. Current medications reviewed as noted below. Recent Labs      08/18/17   0417  08/18/17   0208   PH  7.35  7.28*   PCO2  27*  22*   PO2  75*  71*     Recent Labs      08/18/17   0510  08/18/17   0201  08/17/17   1730   CPK  618*  361*  158   CKMB  14.5*  7.8*  2.4     Recent Labs      08/18/17   0510  08/18/17   0201  08/17/17   1730   NA  137  136  135*   K  3.5  4.0  3.8   CL  104  106  101   CO2  18*  13*  16*   BUN  21*  19  13   CREA  2.32*  2.24*  1.29*   GLU  143*  117*  154*   CA  7.3*  7.7*  8.0*   ALB  2.3*  2.5*  3.3*   WBC   --   6.2   --    HGB   --   9.2*  9.7*   HCT   --   31.1*  33.9*   PLT   --   221   --      Recent Labs      08/18/17   0510  08/18/17   0201  08/17/17   1730   SGOT  45*  25  24   AP  67  72  101   TBILI  0.4  0.4  0.3   TP  5.2*  5.6*  6.9   ALB  2.3*  2.5*  3.3*   GLOB  2.9  3.1  3.6     No results for input(s): INR, PTP, APTT in the last 72 hours. No lab exists for component: INREXT   No results for input(s): FE, TIBC, PSAT, FERR in the last 72 hours.    Lab Results   Component Value Date/Time    Glucose (POC) 98 08/17/2017 06:55 AM    Glucose (POC) 147 09/26/2016 07:50 AM    Glucose (POC) 96 10/14/2009 01:40 PM       Current Facility-Administered Medications   Medication Dose Route Frequency    PHENYLephrine (NEOSYNEPHRINE) 100,000 mcg in 0.9% sodium chloride 250 mL infusion   mcg/min IntraVENous TITRATE    NOREPINEPHrine (LEVOPHED) 16 mg/250 ml D5W infusion  2-16 mcg/min IntraVENous TITRATE    sodium bicarbonate 8.4 % (1 mEq/mL) injection        succinylcholine (ANECTINE) 20 mg/mL injection        midazolam (VERSED) 1 mg/mL injection        propofol (DIPRIVAN) infusion  5-50 mcg/kg/min IntraVENous TITRATE    fentaNYL citrate (PF) injection 50 mcg  50 mcg IntraVENous Q4H PRN    midazolam (VERSED) 1 mg/mL injection        vasopressin (VASOSTRICT) 20 Units in 0.9% sodium chloride 100 mL infusion  0.01-0.04 Units/min IntraVENous TITRATE    [START ON 8/19/2017] anidulafungin (ERAXIS) 100 mg in 0.9% sodium chloride 130 mL IVPB  100 mg IntraVENous Q24H    piperacillin-tazobactam (ZOSYN) 3.375 g in 0.9% sodium chloride (MBP/ADV) 100 mL  3.375 g IntraVENous Q12H    HYDROmorphone (PF) (DILAUDID) injection 1-2 mg  1-2 mg IntraVENous Q2H PRN    lactated Ringers infusion  125 mL/hr IntraVENous CONTINUOUS    enoxaparin (LOVENOX) injection 30 mg  30 mg SubCUTAneous Q24H    artificial tears (dextran 70-hypromellose) (NATURAL BALANCE) 0.1-0.3 % ophthalmic solution 1 Drop  1 Drop Both Eyes PRN    sodium chloride (NS) flush 5-10 mL  5-10 mL IntraVENous Q8H    sodium chloride (NS) flush 5-10 mL  5-10 mL IntraVENous PRN    acetaminophen (TYLENOL) tablet 650 mg  650 mg Oral Q4H PRN    ondansetron (ZOFRAN) injection 4 mg  4 mg IntraVENous Q4H PRN    mupirocin (BACTROBAN) 2 % ointment   Both Nostrils BID         Tangela Hernandez MD

## 2017-08-18 NOTE — CDMP QUERY
3 of 3  Account Number: [de-identified]  MRN: [id number]  Patient: Anton Thornton: [id number]  Clinician Name: Nerissa Howard RN, CCDS   KAMILAH Nelson Cea N :  Please clarify if this patient is being treated/managed for:    => Acute Renal Failure s/p Lap R colectomy in the setting CKD   =>Other Explanation of clinical findings  =>Unable to Determine (no explanation of clinical findings)    The medical record reflects the following clinical findings, treatment, and risk factors:    Risk Factors: HTN, surgery, CKD   Clinical Indicators: : 87WF  patient is anuric with rising renal indices postop, hypotensive, baseline GFR  52, BUN 22, Cr  1.19 increasing Cr  2.24, GFR 25. Tx  IVF resuscitation, IVF pressor, Bicarb, & ICU care. Please clarify and document your clinical opinion in the progress notes and discharge summary including the definitive and/or presumptive diagnosis, (suspected or probable), related to the above clinical findings. Please include clinical findings supporting your diagnosis.     Thank Fer Martinez RN, CCDS

## 2017-08-18 NOTE — PROCEDURES
Arterial Line Procedure Note    Risks, benefits, alternatives explained and patient agrees to proceed. Sterile prep with Chlorhexidine. 0.5 mL 1% Lidocaine placed at insertion site. Left radial artery cannulated x 2 attempt(s) utilizing the Seldinger technique. Start:  4706  End:   1205      Catheter secured. Sterile dressing placed.

## 2017-08-18 NOTE — PROGRESS NOTES
0730 Report received from night nurse, ALYSE JOYCE. Assumed care of patient. Currently on Vinod, Levo, Vasopressin and Bicarb drips. 0800 Assessment as documented. 56 Daughter at bedside. Updated on current status. Vinod increased for low BP.  1050 Labs drawn and sent, ABG drawn by RT.  1130 ABG results given to Dr. Lonnell Duane by RT, orders received. Daughter updated. 1215 Art line placed be Dr. Alexei Valentin, excellent waveform. Cooralates with right arm cuff pressure. 1500 Patient appears more pale, skin mottled on abdomen and lower extremities. Repeat ABG's drawn. 1530 Repeat ABG results given to Dr. Tali Oneill. Orders received. Patient currently on Levo at 40 mcg, Vinod at 225 mcg, Vasopressin at 0.04 and Bicarb infusion at 125. A-line coorelating with cuff pressure and is 466-392 systolic. 1540 Consult called to Dr. Gi Ortega. 0 Dr. Afia Moore updated on patient status. Current resulted labs, recent consult to nephrology, mottled skin to lower extremities, distended abdomen, no urinary output. Orders received. 1700 Dr. Tali Oneill in to speak with daughter. Orders received for change in code status per daughter. 36 Dr. Afia Moore in to see patient and speak with daughter. 1800 Nephrology consult cancelled, Dr. Gi Ortega aware. 1845 Unit of PRBC's infusing. Patient color is better, skin pink. 1900 Report given to ALYSE JOYCE. Vinod at 225mcg, Levo at 40mcg, Vasopressin at 0.02 units/min, Propofol at 30 mcg and bicarb at 125. [place] intact and with good waveform. Daughter and male friend at bedside.

## 2017-08-19 NOTE — PROGRESS NOTES
Spiritual Care Assessment/Progress Notes    Cierra Cuba [id number]  [id number]    10/14/1929  80 y.o.  female    Patient Telephone Number: [phone] (home)   Baptist Affiliation: Orthodox   Language: English   Extended Emergency Contact Information  Primary Emergency Contact: [address] [place] [address] Phone: [phone]  Mobile Phone: [phone]  Relation: Daughter   Patient Active Problem List    Diagnosis Date Noted    Colon cancer ([facility] 08/17/2017    [facility] 08[facility]11/2017    Cough productive of purulent sputum 05/22/2017    Upper respiratory tract infection 05/22/2017    Pulmonary emboli ([facility] 09/22/2016    UTI (urinary tract infection) 09/22/2016    Knee osteoarthritis 09/20/2016    Spinal stenosis, lumbar region, with neurogenic claudication 04/20/2015    Anxiety 12/03/2013    Essential hypertension, benign 09/06/2012    Other and unspecified hyperlipidemia 09/06/2012    Hx-TIA (transient ischemic attack) 09/06/2012        Date: 8/19/2017       Level of Baptist/Spiritual Activity:  []         Involved in anh tradition/spiritual practice    []         Not involved in anh tradition/spiritual practice  []         Spiritually oriented    []         Claims no spiritual orientation    []         seeking spiritual identity  []         Feels alienated from Orthodox practice/tradition  []         Feels angry about Orthodox practice/tradition  []         Spirituality/Orthodox tradition  a resource for coping at this time.   []         Not able to assess due to medical condition    Services Provided Today:  []         crisis intervention    []         reading Scriptures  []         spiritual assessment    []         prayer  []         empathic listening/emotional support  []         rites and rituals (cite in comments)  []         life review     []         Orthodox support  []         theological development   []         advocacy  [] ethical dialog     []         blessing  [x]         bereavement support    [x]         support to family  []         anticipatory grief support   []         help with AMD  []         spiritual guidance    []         meditation      Spiritual Care Needs  []         Emotional Support  []         Spiritual/Yarsanism Care  []         Loss/Adjustment  []         Advocacy/Referral                /Ethics  []         No needs expressed at               this time  []         Other: (note in               comments)  [address]  []         Follow up visits with               pt/family  []         Provide materials  []         Schedule sacraments  []         Contact Community               Clergy  []         Follow up as needed  []         Other: (note in               comments)     Comments: Paged by staff regarding CCU patient in CCU. Family had decided to withdraw mechanical support. When I arrived to unit, was informed by nurse that pt had . Introduced myself to pt's daughter and two grandchildren and offered emotional support. Daughter [place] but had been able to arrive to be with her mom. Affirmed their support for pt and offered words of assurance. Explored additional spiritual needs - none expressed by family at this time. Remained on unit; pt's family left the hospital a short time later.     [facility],

## 2017-08-19 NOTE — PROGRESS NOTES
Her condition has deteriorated. She has continued to require high levels of pressor and bicarb support. Staff advises that she is mottled and skin is cool. Family is requesting w/drawal of support and comfort measures. She is terminally ill w/no hope of recovery and we will proceed w/life support w/drawal.  Narcotic dosing is adjusted for comfort support. Death is anticipated soon and family is at bedside.

## 2017-08-19 NOTE — PROGRESS NOTES
Called to pronounce patient. No response to noxious stimuli. No spontaneous breath sounds nor cardiac sounds. Pupils fixed and dilated. TOD: 6:10am.  Family notified and grieving appropriately.    D/C Summary to be dictated by Attending MD.    ________________________________________________________________________  Marichuy Guaman MD

## 2017-08-21 LAB
ABO + RH BLD: NORMAL
BLD PROD TYP BPU: NORMAL
BLOOD GROUP ANTIBODIES SERPL: NORMAL
BPU ID: NORMAL
CROSSMATCH RESULT,%XM: NORMAL
GLUCOSE BLD STRIP.AUTO-MCNC: NORMAL MG/DL (ref 65–100)
SERVICE CMNT-IMP: NORMAL
SPECIMEN EXP DATE BLD: NORMAL
STATUS OF UNIT,%ST: NORMAL
UNIT DIVISION, %UDIV: 0

## 2017-08-23 NOTE — DISCHARGE SUMMARY
Physician Discharge Summary       Patient ID:  Jerome Cooper  [id number]   80 y.o.  10/14/1929    Admit Date: 8/17/2017    Discharge Date: 8/19/2017    Admission Diagnoses: COLON CA;Colon cancer (HCC);status post    Discharge Diagnoses: Active Problems:    Colon cancer ([facility] [address] (8/17/2017)     Encephalopathy, NOS, metabolic  SIRS w/ acute organ dysfunction in the setting of perforated duodenum postop requiring multiple pressors & surgical repair  Acute Renal Failure s/p perforated duodenum in the setting CKD  Severely Reduced systolic function, EF 42%    Discharge Condition: Death    Last Procedure: Procedure(s):  EXPLORATORY LAPAROTOMY WITH DUODENAL REPAIR  8/18/2017  LAPAROSCOPIC RIGHT COLECTOMY 8/17/2017      Hospital Course: Jerome Cooper was unable to recover from shock/acidosis/multi organ dysfunction, s/p repair of duodenal perforation.    TOD 8/19/2017 @0610      Consults: Cardiology, Hospitalist and Pulmonary/Critical Care

## (undated) DEVICE — ROCKER SWITCH PENCIL BLADE ELECTRODE, HOLSTER: Brand: EDGE

## (undated) DEVICE — TRI-LUMEN FILTERED TUBE SET WITH ACTIVATED CHARCOAL FILTER: Brand: AIRSEAL

## (undated) DEVICE — STAPLER SHEATH: Brand: ENDOWRIST

## (undated) DEVICE — APPLICATOR FLEXIBLE 360D 40CM --

## (undated) DEVICE — STAPLER SKIN 35CT WD STRL DISP -- MULTIFIRE PREMIUM

## (undated) DEVICE — SUTURE PERMAHAND SZ 3-0 L30IN NONABSORBABLE BLK SILK BRAID A304H

## (undated) DEVICE — SURGICAL PROCEDURE KIT GEN LAPAROSCOPY LF

## (undated) DEVICE — SUTURE SZ 0 27IN 5/8 CIR UR-6  TAPER PT VIOLET ABSRB VICRYL J603H

## (undated) DEVICE — TOWEL SURG W17XL27IN STD BLU COT NONFENESTRATED PREWASHED

## (undated) DEVICE — TOWEL 4 PLY TISS 19X30 SUE WHT

## (undated) DEVICE — FORCEPS BX L240CM JAW DIA2.8MM L CAP W/ NDL MIC MESH TOOTH

## (undated) DEVICE — SET ADMIN 16ML TBNG L100IN 2 Y INJ SITE IV PIGGY BK DISP

## (undated) DEVICE — DBD-PACK,LAPAROTOMY,2 REINFORCED GOWNS: Brand: MEDLINE

## (undated) DEVICE — STAPLER 45 RELOAD: Brand: ENDOWRIST

## (undated) DEVICE — SUTURE STRATAFIX SYMMETRIC PDS + SZ 0 L18IN ABSRB L36MM SXPP1A401

## (undated) DEVICE — INFECTION CONTROL KIT SYS

## (undated) DEVICE — BLOCK BITE ENDOSCP AD 21 MM W/ DIL BLU LF DISP

## (undated) DEVICE — DRAPE FLD WRM W44XL66IN C6L FOR INTRATEMP SYS THERMABASIN

## (undated) DEVICE — CATH IV AUTOGRD BC BLU 22GA 25 -- INSYTE

## (undated) DEVICE — GLOVE SURG SZ 7.5 L11.2IN THK9.8MIL STRW LTX POLYMER BEAD

## (undated) DEVICE — REM POLYHESIVE ADULT PATIENT RETURN ELECTRODE: Brand: VALLEYLAB

## (undated) DEVICE — Device

## (undated) DEVICE — DERMABOND SKIN ADH 0.7ML -- DERMABOND ADVANCED 12/BX

## (undated) DEVICE — (D)SYR 10ML 1/5ML GRAD NSAF -- PKGING CHANGE USE ITEM 338027

## (undated) DEVICE — VISUALIZATION SYSTEM: Brand: CLEARIFY

## (undated) DEVICE — BAG SPEC BIOHZRD 10 X 10 IN --

## (undated) DEVICE — STAPLER CANNULA SEAL: Brand: ENDOWRIST

## (undated) DEVICE — BASIN EMESIS 500CC ROSE 250/CS 60/PLT: Brand: MEDEGEN MEDICAL PRODUCTS, LLC

## (undated) DEVICE — GOWN,SIRUS,POLYRNF,BRTHSLV,XL,30/CS: Brand: MEDLINE

## (undated) DEVICE — SUTURE PDS II SZ 0 L60IN ABSRB VLT L48MM CTX 1/2 CIR Z990G

## (undated) DEVICE — SUT SLK 2-0SH 30IN BLK --

## (undated) DEVICE — NEEDLE HYPO 18GA L1.5IN PNK S STL HUB POLYPR SHLD REG BVL

## (undated) DEVICE — SUTURE ABSORBABLE BRAIDED 3-0 SHB 18 IN UD VICRYL + VCPB864D

## (undated) DEVICE — SUTURE VCRL SZ 3-0 L27IN ABSRB VLT L26MM SH 1/2 CIR J316H

## (undated) DEVICE — SUTURE PERMAHAND SZ 3-0 L30IN NONABSORBABLE BLK L26MM SH C017D

## (undated) DEVICE — SUTURE STRATAFIX SYMMETRIC SZ 1 L18IN ABSRB VLT CT1 L36CM SXPP1A404

## (undated) DEVICE — SUTURE ABSRB L30CM 2-0 VLT SPRL PDS + STRATAFIX SXPP1B410

## (undated) DEVICE — 3000CC GUARDIAN II: Brand: GUARDIAN

## (undated) DEVICE — SPONGE HEMOSTAT CELLULS 4X8IN -- SURGICEL

## (undated) DEVICE — DRAIN SURG W10MMXL20CM SIL FULL PERF HUBLESS FLAT RADPQ

## (undated) DEVICE — DEVON™ KNEE AND BODY STRAP 60" X 3" (1.5 M X 7.6 CM): Brand: DEVON

## (undated) DEVICE — INTENDED FOR TISSUE SEPARATION, AND OTHER PROCEDURES THAT REQUIRE A SHARP SURGICAL BLADE TO PUNCTURE OR CUT.: Brand: BARD-PARKER ® CARBON RIB-BACK BLADES

## (undated) DEVICE — HANDLE LT SNAP ON ULT DURABLE LENS FOR TRUMPF ALC DISPOSABLE

## (undated) DEVICE — SEALANT FIBRIN 4 CC FRZN PRE FILLED SYR TISSEEL

## (undated) DEVICE — WRAP SURG W1.31XL1.34M CARD FOR PT 165-172CM THERMOWRP

## (undated) DEVICE — TUBE KT ENDFLTN INSUFFLTN SVL --

## (undated) DEVICE — KENDALL SCD EXPRESS SLEEVES, KNEE LENGTH, MEDIUM: Brand: KENDALL SCD

## (undated) DEVICE — SUTURE PERMAHAND SZ 3-0 L18IN NONABSORBABLE BLK L26MM SH C013D

## (undated) DEVICE — 1200 GUARD II KIT W/5MM TUBE W/O VAC TUBE: Brand: GUARDIAN

## (undated) DEVICE — BLADE ASSEMB CLP HAIR FINE --

## (undated) DEVICE — SUTURE STRATAFIX SPRL PDS + SZ 2-0 L6IN ABSRB VLT L36MM SXPP1B409

## (undated) DEVICE — Device: Brand: MEDEX

## (undated) DEVICE — SURGICAL PROCEDURE PACK BASIN MAJ SET CUST NO CAUT

## (undated) DEVICE — SYR 3ML LL TIP 1/10ML GRAD --

## (undated) DEVICE — SUT SLK 3-0 30IN SH BLK --

## (undated) DEVICE — DUAL LUMEN STOMACH TUBE MULTI-FUNCTIONAL PORT: Brand: SALEM SUMP

## (undated) DEVICE — SUTURE VCRL SZ 0 L36IN ABSRB VLT L36MM CT-1 1/2 CIR J346H

## (undated) DEVICE — Z DISCONTINUED PER MEDLINE LINE GAS SAMPLING O2/CO2 LNG AD 13 FT NSL W/ TBNG FILTERLINE

## (undated) DEVICE — CTRL LIQCK LEV 3 --

## (undated) DEVICE — SUTURE MCRYL SZ 4-0 L27IN ABSRB UD L19MM PS-2 1/2 CIR PRIM Y426H

## (undated) DEVICE — SPONGE LAP 18X18IN STRL -- 5/PK

## (undated) DEVICE — NEONATAL-ADULT SPO2 SENSOR: Brand: NELLCOR

## (undated) DEVICE — CATH IV AUTOGRD BC PNK 20GA 25 -- INSYTE

## (undated) DEVICE — CATHETERIZATION TRAY FOL 14 FR URIMTR STATLOK SURSTP

## (undated) DEVICE — SUTURE PERMAHAND SZ 2-0 L30IN NONABSORBABLE BLK SILK W/O A305H

## (undated) DEVICE — COVER,TABLE,60X90,STERILE: Brand: MEDLINE

## (undated) DEVICE — TRAY CATH 16F DRN BG LTX -- CONVERT TO ITEM 363158

## (undated) DEVICE — CONTAINER SPEC 20 ML LID NEUT BUFF FORMALIN 10 % POLYPR STS

## (undated) DEVICE — SOLIDIFIER MEDC 1200ML -- CONVERT TO 356117

## (undated) DEVICE — VESSEL SEALER: Brand: ENDOWRIST;DAVINCI SI

## (undated) DEVICE — SOLUTION IV 1000ML 0.9% SOD CHL

## (undated) DEVICE — KENDALL RADIOLUCENT FOAM MONITORING ELECTRODE RECTANGULAR SHAPE: Brand: KENDALL

## (undated) DEVICE — WOUND RETRACTOR AND PROTECTOR: Brand: ALEXIS O WOUND PROTECTOR-RETRACTOR

## (undated) DEVICE — (D)PREP SKN CHLRAPRP APPL 26ML -- CONVERT TO ITEM 371833

## (undated) DEVICE — MEDI-VAC YANK SUCT HNDL W/TPRD BULBOUS TIP: Brand: CARDINAL HEALTH